# Patient Record
Sex: FEMALE | Race: WHITE | NOT HISPANIC OR LATINO | URBAN - METROPOLITAN AREA
[De-identification: names, ages, dates, MRNs, and addresses within clinical notes are randomized per-mention and may not be internally consistent; named-entity substitution may affect disease eponyms.]

---

## 2017-01-18 PROBLEM — M16.12 PRIMARY OSTEOARTHRITIS OF LEFT HIP: Status: ACTIVE | Noted: 2017-01-18

## 2017-03-01 ENCOUNTER — OUTPATIENT (OUTPATIENT)
Dept: OUTPATIENT SERVICES | Facility: HOSPITAL | Age: 67
LOS: 1 days | End: 2017-03-01
Payer: COMMERCIAL

## 2017-03-01 ENCOUNTER — OUTPATIENT (OUTPATIENT)
Dept: OUTPATIENT SERVICES | Facility: HOSPITAL | Age: 67
LOS: 1 days | End: 2017-03-01

## 2017-03-01 DIAGNOSIS — Z95.1 PRESENCE OF AORTOCORONARY BYPASS GRAFT: Chronic | ICD-10-CM

## 2017-03-01 DIAGNOSIS — Z22.321 CARRIER OR SUSPECTED CARRIER OF METHICILLIN SUSCEPTIBLE STAPHYLOCOCCUS AUREUS: ICD-10-CM

## 2017-03-01 DIAGNOSIS — I25.810 ATHEROSCLEROSIS OF CORONARY ARTERY BYPASS GRAFT(S) WITHOUT ANGINA PECTORIS: Chronic | ICD-10-CM

## 2017-03-01 DIAGNOSIS — Z96.60 PRESENCE OF UNSPECIFIED ORTHOPEDIC JOINT IMPLANT: Chronic | ICD-10-CM

## 2017-03-01 DIAGNOSIS — Z90.49 ACQUIRED ABSENCE OF OTHER SPECIFIED PARTS OF DIGESTIVE TRACT: Chronic | ICD-10-CM

## 2017-03-01 DIAGNOSIS — Z95.5 PRESENCE OF CORONARY ANGIOPLASTY IMPLANT AND GRAFT: Chronic | ICD-10-CM

## 2017-03-01 PROCEDURE — 71020: CPT | Mod: 26

## 2017-03-01 PROCEDURE — 73502 X-RAY EXAM HIP UNI 2-3 VIEWS: CPT | Mod: 26,LT

## 2017-03-01 PROCEDURE — 71046 X-RAY EXAM CHEST 2 VIEWS: CPT

## 2017-03-01 PROCEDURE — 73502 X-RAY EXAM HIP UNI 2-3 VIEWS: CPT

## 2017-03-02 LAB
MRSA PCR RESULT.: NEGATIVE — SIGNIFICANT CHANGE UP
S AUREUS DNA NOSE QL NAA+PROBE: NEGATIVE — SIGNIFICANT CHANGE UP

## 2017-03-10 ENCOUNTER — APPOINTMENT (OUTPATIENT)
Dept: ORTHOPEDIC SURGERY | Facility: CLINIC | Age: 67
End: 2017-03-10

## 2017-03-10 VITALS
WEIGHT: 175 LBS | SYSTOLIC BLOOD PRESSURE: 110 MMHG | HEIGHT: 62 IN | BODY MASS INDEX: 32.2 KG/M2 | DIASTOLIC BLOOD PRESSURE: 70 MMHG

## 2017-03-10 DIAGNOSIS — M17.11 UNILATERAL PRIMARY OSTEOARTHRITIS, RIGHT KNEE: ICD-10-CM

## 2017-03-21 ENCOUNTER — RESULT REVIEW (OUTPATIENT)
Age: 67
End: 2017-03-21

## 2017-03-21 VITALS
HEART RATE: 80 BPM | TEMPERATURE: 98 F | OXYGEN SATURATION: 97 % | WEIGHT: 202.38 LBS | RESPIRATION RATE: 16 BRPM | DIASTOLIC BLOOD PRESSURE: 71 MMHG | HEIGHT: 63 IN | SYSTOLIC BLOOD PRESSURE: 159 MMHG

## 2017-03-21 RX ORDER — CELECOXIB 200 MG/1
400 CAPSULE ORAL ONCE
Qty: 0 | Refills: 0 | Status: COMPLETED | OUTPATIENT
Start: 2017-03-22 | End: 2017-03-22

## 2017-03-21 RX ORDER — OXYCODONE HYDROCHLORIDE 5 MG/1
20 TABLET ORAL ONCE
Qty: 0 | Refills: 0 | Status: DISCONTINUED | OUTPATIENT
Start: 2017-03-22 | End: 2017-03-22

## 2017-03-21 NOTE — H&P ADULT - PMH
CAD (coronary artery disease)    Diverticulitis    H/O gastric ulcer    Hypertension    Myocardial infarction    OA (osteoarthritis)

## 2017-03-21 NOTE — H&P ADULT - PROBLEM SELECTOR PLAN 1
Admit to Orthopaedic Service.  Presents today for elective left total hip replacement  Pt medically stable and cleared for procedure today by Dr. Marie and Dr. Jennings

## 2017-03-21 NOTE — PATIENT PROFILE ADULT. - PMH
CAD (coronary artery disease)    Diverticulitis    H/O gastric ulcer    Hypertension    OA (osteoarthritis)

## 2017-03-21 NOTE — H&P ADULT - NSHPLABSRESULTS_GEN_ALL_CORE
Preop CBC, BMP, PT/PTT/INR, UA - WNL per medical clearance   Preop EKG - inferior MI indeterminate age with posterior extension - WNL per medical clearance   Preop CXR - clear lungs - WNL per medical clearance     Stress Echo - EF 50-55% - WNL per cardiac clearance Preop CBC, BMP, PT/PTT/INR, UA - WNL per medical clearance   Preop EKG - inferior MI indeterminate age with posterior extension - WNL per medical clearance   Preop CXR - clear lungs - WNL per medical clearance     Stress Echo - LVEF 50-55% - WNL per cardiac clearance

## 2017-03-21 NOTE — H&P ADULT - NSHPPHYSICALEXAM_GEN_ALL_CORE
MSK:  +decreased ROM secondary to pain, left hip    Remainder of exam per medical clearance note Gen: Pt seated in chair in NAD   Skin: Warm, color normal, no wounds, rashes, scars  Extremities: No clubbing, no cyanosis, no edema.  Musculoskeletal: Decreased ROM secondary to pain left hip. Sensation intact to light touch to bilateral LE distally. Motor Strength 5/5 to TA/GS/EHL/FHL bilaterally.  Vascular: DP/PT 1+, Brisk cap refill, Toes wwp     Remainder of exam as per medical clearance note

## 2017-03-21 NOTE — PATIENT PROFILE ADULT. - PSH
Coronary artery disease involving coronary bypass graft    History of colon resection  12/2015  History of coronary artery stent placement  x 4, 2010  S/P CABG x 3  2010  S/P total hip resurfacing  right, 2007 Coronary artery disease involving coronary bypass graft    History of colon resection  12/2015  History of coronary artery stent placement  x 4, 2010  History of total knee replacement, right    S/P CABG x 3  2010  S/P total hip resurfacing  right, 2007

## 2017-03-21 NOTE — H&P ADULT - PSH
Coronary artery disease involving coronary bypass graft    History of colon resection  12/2015  History of coronary artery stent placement  x 4, 2010  S/P CABG x 3  2010  S/P total hip resurfacing  right, 2007

## 2017-03-21 NOTE — H&P ADULT - HISTORY OF PRESENT ILLNESS
66F with left hip pain x    Presents today for elective left total hip replacement 66F with left hip pain since December that began spontaneously and without accident or trauma. Pt underwent Right Total Knee Arthroplasty in July of last year and states her left hip began to gradually bother her after that time. Pt endorses groin pain which radiates to her mid-calf and is associated with left knee pain also. Pt denies N/T of bilateral LE. Pt pain has progressively worsened without improvement and has become increasingly unresponsive to conservative management. Pt ambulates without assistance at home. Denies numbness/tingling of extremities, F/C/N/V, CP, SOB today.     Presents today for elective left total hip replacement

## 2017-03-22 ENCOUNTER — INPATIENT (INPATIENT)
Facility: HOSPITAL | Age: 67
LOS: 1 days | Discharge: HOME | DRG: 470 | End: 2017-03-24
Attending: ORTHOPAEDIC SURGERY | Admitting: ORTHOPAEDIC SURGERY
Payer: COMMERCIAL

## 2017-03-22 DIAGNOSIS — Z96.60 PRESENCE OF UNSPECIFIED ORTHOPEDIC JOINT IMPLANT: Chronic | ICD-10-CM

## 2017-03-22 DIAGNOSIS — I25.810 ATHEROSCLEROSIS OF CORONARY ARTERY BYPASS GRAFT(S) WITHOUT ANGINA PECTORIS: Chronic | ICD-10-CM

## 2017-03-22 DIAGNOSIS — Z90.49 ACQUIRED ABSENCE OF OTHER SPECIFIED PARTS OF DIGESTIVE TRACT: Chronic | ICD-10-CM

## 2017-03-22 DIAGNOSIS — M16.12 UNILATERAL PRIMARY OSTEOARTHRITIS, LEFT HIP: ICD-10-CM

## 2017-03-22 DIAGNOSIS — Z95.1 PRESENCE OF AORTOCORONARY BYPASS GRAFT: Chronic | ICD-10-CM

## 2017-03-22 DIAGNOSIS — Z95.5 PRESENCE OF CORONARY ANGIOPLASTY IMPLANT AND GRAFT: Chronic | ICD-10-CM

## 2017-03-22 DIAGNOSIS — Z96.651 PRESENCE OF RIGHT ARTIFICIAL KNEE JOINT: Chronic | ICD-10-CM

## 2017-03-22 PROCEDURE — 72170 X-RAY EXAM OF PELVIS: CPT | Mod: 26

## 2017-03-22 RX ORDER — DOCUSATE SODIUM 100 MG
100 CAPSULE ORAL THREE TIMES A DAY
Qty: 0 | Refills: 0 | Status: DISCONTINUED | OUTPATIENT
Start: 2017-03-22 | End: 2017-03-24

## 2017-03-22 RX ORDER — POLYETHYLENE GLYCOL 3350 17 G/17G
17 POWDER, FOR SOLUTION ORAL DAILY
Qty: 0 | Refills: 0 | Status: DISCONTINUED | OUTPATIENT
Start: 2017-03-22 | End: 2017-03-24

## 2017-03-22 RX ORDER — SODIUM CHLORIDE 9 MG/ML
1000 INJECTION, SOLUTION INTRAVENOUS
Qty: 0 | Refills: 0 | Status: DISCONTINUED | OUTPATIENT
Start: 2017-03-22 | End: 2017-03-24

## 2017-03-22 RX ORDER — SENNA PLUS 8.6 MG/1
2 TABLET ORAL AT BEDTIME
Qty: 0 | Refills: 0 | Status: DISCONTINUED | OUTPATIENT
Start: 2017-03-22 | End: 2017-03-24

## 2017-03-22 RX ORDER — ONDANSETRON 8 MG/1
4 TABLET, FILM COATED ORAL EVERY 6 HOURS
Qty: 0 | Refills: 0 | Status: DISCONTINUED | OUTPATIENT
Start: 2017-03-22 | End: 2017-03-24

## 2017-03-22 RX ORDER — PANTOPRAZOLE SODIUM 20 MG/1
40 TABLET, DELAYED RELEASE ORAL DAILY
Qty: 0 | Refills: 0 | Status: DISCONTINUED | OUTPATIENT
Start: 2017-03-22 | End: 2017-03-24

## 2017-03-22 RX ORDER — OXYCODONE HYDROCHLORIDE 5 MG/1
10 TABLET ORAL EVERY 4 HOURS
Qty: 0 | Refills: 0 | Status: DISCONTINUED | OUTPATIENT
Start: 2017-03-22 | End: 2017-03-24

## 2017-03-22 RX ORDER — OXYCODONE HYDROCHLORIDE 5 MG/1
5 TABLET ORAL EVERY 4 HOURS
Qty: 0 | Refills: 0 | Status: DISCONTINUED | OUTPATIENT
Start: 2017-03-22 | End: 2017-03-24

## 2017-03-22 RX ORDER — ACETAMINOPHEN 500 MG
650 TABLET ORAL EVERY 6 HOURS
Qty: 0 | Refills: 0 | Status: DISCONTINUED | OUTPATIENT
Start: 2017-03-22 | End: 2017-03-24

## 2017-03-22 RX ORDER — NITROGLYCERIN 6.5 MG
1 CAPSULE, EXTENDED RELEASE ORAL DAILY
Qty: 0 | Refills: 0 | Status: DISCONTINUED | OUTPATIENT
Start: 2017-03-22 | End: 2017-03-24

## 2017-03-22 RX ORDER — CELECOXIB 200 MG/1
200 CAPSULE ORAL
Qty: 0 | Refills: 0 | Status: DISCONTINUED | OUTPATIENT
Start: 2017-03-22 | End: 2017-03-24

## 2017-03-22 RX ORDER — CEFAZOLIN SODIUM 1 G
2000 VIAL (EA) INJECTION EVERY 8 HOURS
Qty: 0 | Refills: 0 | Status: COMPLETED | OUTPATIENT
Start: 2017-03-22 | End: 2017-03-23

## 2017-03-22 RX ORDER — MORPHINE SULFATE 50 MG/1
4 CAPSULE, EXTENDED RELEASE ORAL
Qty: 0 | Refills: 0 | Status: DISCONTINUED | OUTPATIENT
Start: 2017-03-22 | End: 2017-03-24

## 2017-03-22 RX ORDER — ASPIRIN/CALCIUM CARB/MAGNESIUM 324 MG
325 TABLET ORAL
Qty: 0 | Refills: 0 | Status: DISCONTINUED | OUTPATIENT
Start: 2017-03-22 | End: 2017-03-22

## 2017-03-22 RX ORDER — VERAPAMIL HCL 240 MG
120 CAPSULE, EXTENDED RELEASE PELLETS 24 HR ORAL
Qty: 0 | Refills: 0 | Status: DISCONTINUED | OUTPATIENT
Start: 2017-03-22 | End: 2017-03-24

## 2017-03-22 RX ORDER — LISINOPRIL 2.5 MG/1
10 TABLET ORAL DAILY
Qty: 0 | Refills: 0 | Status: DISCONTINUED | OUTPATIENT
Start: 2017-03-22 | End: 2017-03-24

## 2017-03-22 RX ORDER — ASPIRIN/CALCIUM CARB/MAGNESIUM 324 MG
81 TABLET ORAL DAILY
Qty: 0 | Refills: 0 | Status: DISCONTINUED | OUTPATIENT
Start: 2017-03-22 | End: 2017-03-24

## 2017-03-22 RX ORDER — FUROSEMIDE 40 MG
20 TABLET ORAL DAILY
Qty: 0 | Refills: 0 | Status: DISCONTINUED | OUTPATIENT
Start: 2017-03-22 | End: 2017-03-24

## 2017-03-22 RX ORDER — MORPHINE SULFATE 50 MG/1
2 CAPSULE, EXTENDED RELEASE ORAL
Qty: 0 | Refills: 0 | Status: DISCONTINUED | OUTPATIENT
Start: 2017-03-22 | End: 2017-03-24

## 2017-03-22 RX ORDER — CLOPIDOGREL BISULFATE 75 MG/1
75 TABLET, FILM COATED ORAL DAILY
Qty: 0 | Refills: 0 | Status: DISCONTINUED | OUTPATIENT
Start: 2017-03-23 | End: 2017-03-24

## 2017-03-22 RX ORDER — ASPIRIN/CALCIUM CARB/MAGNESIUM 324 MG
81 TABLET ORAL DAILY
Qty: 0 | Refills: 0 | Status: DISCONTINUED | OUTPATIENT
Start: 2017-03-22 | End: 2017-03-22

## 2017-03-22 RX ADMIN — Medication 100 MILLIGRAM(S): at 22:24

## 2017-03-22 RX ADMIN — SENNA PLUS 2 TABLET(S): 8.6 TABLET ORAL at 22:25

## 2017-03-22 RX ADMIN — OXYCODONE HYDROCHLORIDE 20 MILLIGRAM(S): 5 TABLET ORAL at 07:03

## 2017-03-22 RX ADMIN — SODIUM CHLORIDE 120 MILLILITER(S): 9 INJECTION, SOLUTION INTRAVENOUS at 13:10

## 2017-03-22 RX ADMIN — Medication 81 MILLIGRAM(S): at 22:25

## 2017-03-22 RX ADMIN — Medication 100 MILLIGRAM(S): at 16:06

## 2017-03-22 RX ADMIN — OXYCODONE HYDROCHLORIDE 5 MILLIGRAM(S): 5 TABLET ORAL at 23:24

## 2017-03-22 RX ADMIN — CELECOXIB 400 MILLIGRAM(S): 200 CAPSULE ORAL at 07:02

## 2017-03-22 RX ADMIN — PANTOPRAZOLE SODIUM 40 MILLIGRAM(S): 20 TABLET, DELAYED RELEASE ORAL at 19:55

## 2017-03-22 RX ADMIN — Medication 1 PATCH: at 19:54

## 2017-03-22 RX ADMIN — Medication 120 MILLIGRAM(S): at 19:55

## 2017-03-22 RX ADMIN — OXYCODONE HYDROCHLORIDE 5 MILLIGRAM(S): 5 TABLET ORAL at 22:24

## 2017-03-22 RX ADMIN — LISINOPRIL 10 MILLIGRAM(S): 2.5 TABLET ORAL at 22:24

## 2017-03-22 RX ADMIN — OXYCODONE HYDROCHLORIDE 5 MILLIGRAM(S): 5 TABLET ORAL at 16:26

## 2017-03-22 NOTE — PHYSICAL THERAPY INITIAL EVALUATION ADULT - LIVES WITH, PROFILE
upon discharge patient will be returning to apartment without stairs, however in two weeks will be going to an apartment with 20 steps to enter, independent prior to arrival, no hha/spouse

## 2017-03-22 NOTE — PHYSICAL THERAPY INITIAL EVALUATION ADULT - GENERAL OBSERVATIONS, REHAB EVAL
Patient received supine complaints of L hip pain 6/10 +IV +prevena +B SCD. Patient left seated in chair in NAD, pain 7/10 +lines intact +OPAL schmid

## 2017-03-22 NOTE — PROGRESS NOTE ADULT - SUBJECTIVE AND OBJECTIVE BOX
Ortho Post Op Check    Procedure: left THR  Surgeon: Pierre    Pt comfortable without complaints, pain controlled  Denies CP, SOB, N/V, numbness/tingling     Vital Signs Last 24 Hrs  T(C): 36.5, Max: 36.5 (03-22 @ 10:59)  T(F): 97.7, Max: 97.7 (03-22 @ 10:59)  HR: 52 (42 - 60)  BP: 90/40 (81/34 - 105/44)  BP(mean): --  RR: 18 (6 - 22)  SpO2: 98% (94% - 99%)  Wt(kg): --    General: Pt Alert and oriented, NAD  DSG C/D/I left hip  Pulses: pulses 2+ BL on lower extremities   Sensation: intact BL on lower extremities   Motor: EHL/FHL/TA/GS strength 5/5                          10.2   10.9  )-----------( 180      ( 22 Mar 2017 11:22 )             30.6       Post-op X-Ray: Prosthesis in place without fractures    A/P: 66yFemale POD#0 s/p left THR  - Stable  - Pain Control  - DVT ppx: aspirin, plavix  - Post op abx: Cefazolin  - PT, WBS: WBAT, Total hip protocol    Ortho Pager [phone]

## 2017-03-22 NOTE — PHYSICAL THERAPY INITIAL EVALUATION ADULT - CRITERIA FOR SKILLED THERAPEUTIC INTERVENTIONS
impairments found/rehab potential/anticipated discharge recommendation/anticipated equipment needs at discharge/functional limitations in following categories/therapy frequency

## 2017-03-23 DIAGNOSIS — I25.118 ATHEROSCLEROTIC HEART DISEASE OF NATIVE CORONARY ARTERY WITH OTHER FORMS OF ANGINA PECTORIS: ICD-10-CM

## 2017-03-23 LAB — SURGICAL PATHOLOGY STUDY: SIGNIFICANT CHANGE UP

## 2017-03-23 RX ADMIN — OXYCODONE HYDROCHLORIDE 5 MILLIGRAM(S): 5 TABLET ORAL at 11:03

## 2017-03-23 RX ADMIN — SENNA PLUS 2 TABLET(S): 8.6 TABLET ORAL at 21:45

## 2017-03-23 RX ADMIN — Medication 100 MILLIGRAM(S): at 13:08

## 2017-03-23 RX ADMIN — Medication 81 MILLIGRAM(S): at 21:45

## 2017-03-23 RX ADMIN — Medication 20 MILLIGRAM(S): at 07:20

## 2017-03-23 RX ADMIN — OXYCODONE HYDROCHLORIDE 5 MILLIGRAM(S): 5 TABLET ORAL at 23:24

## 2017-03-23 RX ADMIN — OXYCODONE HYDROCHLORIDE 5 MILLIGRAM(S): 5 TABLET ORAL at 14:08

## 2017-03-23 RX ADMIN — CLOPIDOGREL BISULFATE 75 MILLIGRAM(S): 75 TABLET, FILM COATED ORAL at 11:02

## 2017-03-23 RX ADMIN — POLYETHYLENE GLYCOL 3350 17 GRAM(S): 17 POWDER, FOR SOLUTION ORAL at 11:02

## 2017-03-23 RX ADMIN — OXYCODONE HYDROCHLORIDE 5 MILLIGRAM(S): 5 TABLET ORAL at 10:03

## 2017-03-23 RX ADMIN — PANTOPRAZOLE SODIUM 40 MILLIGRAM(S): 20 TABLET, DELAYED RELEASE ORAL at 11:02

## 2017-03-23 RX ADMIN — Medication 100 MILLIGRAM(S): at 21:45

## 2017-03-23 RX ADMIN — Medication 100 MILLIGRAM(S): at 07:20

## 2017-03-23 RX ADMIN — Medication 100 MILLIGRAM(S): at 00:23

## 2017-03-23 RX ADMIN — Medication 1 PATCH: at 07:22

## 2017-03-23 NOTE — PROGRESS NOTE ADULT - SUBJECTIVE AND OBJECTIVE BOX
appears OK  needs to re-start clopidogrel and 81mg aspirin today,  along with ranitidine  discussed with ortho staff

## 2017-03-23 NOTE — PROGRESS NOTE ADULT - SUBJECTIVE AND OBJECTIVE BOX
SUBJECTIVE: Patient seen and examined.  No issues overnight. Pain well controlled. Denies CP/SOB    OBJECTIVE:     Vital Signs Last 24 Hrs  T(C): 36.6, Max: 36.6 (03-23 @ 09:03)  T(F): 97.8, Max: 97.8 (03-23 @ 09:03)  HR: 52 (42 - 60)  BP: 122/40 (81/34 - 122/40)  BP(mean): --  RR: 18 (6 - 22)  SpO2: 97% (94% - 99%)      LLE           Dressing: clean/dry/intact            Motor exam:  TA 5/5 EHL 5/5 GSC 5/5          Sensation:   T SILT SPH SILT DP SILT         Vascular:  Warm/pink/well perfused             LABS:                        9.5    15.0  )-----------( 182      ( 23 Mar 2017 06:50 )             29.5     23 Mar 2017 06:51    142    |  108    |  27     ----------------------------<  171    4.3     |  25     |  0.85     Ca    8.2        23 Mar 2017 06:51            MEDICATIONS:  MEDICATIONS  (STANDING):  lisinopril 10milliGRAM(s) Oral daily  nitroglycerin    Patch 0.4 mG/Hr(s) 1patch Transdermal daily  verapamil SR 120milliGRAM(s) Oral two times a day  clopidogrel Tablet 75milliGRAM(s) Oral daily  furosemide    Tablet 20milliGRAM(s) Oral daily  lactated ringers. 1000milliLiter(s) IV Continuous <Continuous>  celecoxib 200milliGRAM(s) Oral with breakfast  pantoprazole    Tablet 40milliGRAM(s) Oral daily  polyethylene glycol 3350 17Gram(s) Oral daily  senna 2Tablet(s) Oral at bedtime  docusate sodium 100milliGRAM(s) Oral three times a day  aspirin enteric coated 81milliGRAM(s) Oral daily    MEDICATIONS  (PRN):  acetaminophen   Tablet 650milliGRAM(s) Oral every 6 hours PRN For Temp over 38.3 C (100.94 F)  oxyCODONE IR 5milliGRAM(s) Oral every 4 hours PRN Mild Pain  oxyCODONE IR 10milliGRAM(s) Oral every 4 hours PRN Moderate Pain  morphine  - Injectable 2milliGRAM(s) IV Push every 3 hours PRN Severe Pain  morphine  - Injectable 4milliGRAM(s) IV Push every 15 minutes PRN Severe Pain (7 - 10)  aluminum hydroxide/magnesium hydroxide/simethicone Suspension 30milliLiter(s) Oral four times a day PRN Indigestion  ondansetron Injectable 4milliGRAM(s) IV Push every 6 hours PRN Nausea and/or Vomiting      Anticoagulation:  clopidogrel Tablet 75milliGRAM(s) Oral daily  aspirin enteric coated 81milliGRAM(s) Oral daily      Antibiotics:       Pain medications:   acetaminophen   Tablet 650milliGRAM(s) Oral every 6 hours PRN  celecoxib 200milliGRAM(s) Oral with breakfast  oxyCODONE IR 5milliGRAM(s) Oral every 4 hours PRN  oxyCODONE IR 10milliGRAM(s) Oral every 4 hours PRN  morphine  - Injectable 2milliGRAM(s) IV Push every 3 hours PRN  morphine  - Injectable 4milliGRAM(s) IV Push every 15 minutes PRN  ondansetron Injectable 4milliGRAM(s) IV Push every 6 hours PRN        A/P :   s/p   L LAKESHA pod #1  -    Pain control  -    DVT ppx: clopidogrel Tablet 75milliGRAM(s) Oral daily  aspirin enteric coated 81milliGRAM(s) Oral daily    -    ADAT  -    Check AM labs  -    Weight bearing status:  WBAT  -    Resume home meds  -    Physical Therapy  -    Dispo: Home tomorrow

## 2017-03-24 ENCOUNTER — TRANSCRIPTION ENCOUNTER (OUTPATIENT)
Age: 67
End: 2017-03-24

## 2017-03-24 VITALS
OXYGEN SATURATION: 100 % | RESPIRATION RATE: 18 BRPM | HEART RATE: 75 BPM | SYSTOLIC BLOOD PRESSURE: 134 MMHG | DIASTOLIC BLOOD PRESSURE: 47 MMHG

## 2017-03-24 LAB
ANION GAP SERPL CALC-SCNC: 7 MMOL/L — LOW (ref 9–16)
BUN SERPL-MCNC: 20 MG/DL — SIGNIFICANT CHANGE UP (ref 7–23)
CALCIUM SERPL-MCNC: 7.9 MG/DL — LOW (ref 8.5–10.5)
CHLORIDE SERPL-SCNC: 104 MMOL/L — SIGNIFICANT CHANGE UP (ref 96–108)
CO2 SERPL-SCNC: 28 MMOL/L — SIGNIFICANT CHANGE UP (ref 22–31)
CREAT SERPL-MCNC: 0.79 MG/DL — SIGNIFICANT CHANGE UP (ref 0.5–1.3)
GLUCOSE SERPL-MCNC: 143 MG/DL — HIGH (ref 70–99)
HCT VFR BLD CALC: 29.2 % — LOW (ref 34.5–45)
HGB BLD-MCNC: 9.6 G/DL — LOW (ref 11.5–15.5)
MCHC RBC-ENTMCNC: 28.2 PG — SIGNIFICANT CHANGE UP (ref 27–34)
MCHC RBC-ENTMCNC: 32.9 G/DL — SIGNIFICANT CHANGE UP (ref 32–36)
MCV RBC AUTO: 85.9 FL — SIGNIFICANT CHANGE UP (ref 80–100)
PLATELET # BLD AUTO: 199 K/UL — SIGNIFICANT CHANGE UP (ref 150–400)
POTASSIUM SERPL-MCNC: 3.7 MMOL/L — SIGNIFICANT CHANGE UP (ref 3.5–5.3)
POTASSIUM SERPL-SCNC: 3.7 MMOL/L — SIGNIFICANT CHANGE UP (ref 3.5–5.3)
RBC # BLD: 3.4 M/UL — LOW (ref 3.8–5.2)
RBC # FLD: 13.8 % — SIGNIFICANT CHANGE UP (ref 10.3–16.9)
SODIUM SERPL-SCNC: 139 MMOL/L — SIGNIFICANT CHANGE UP (ref 135–145)
WBC # BLD: 11.4 K/UL — HIGH (ref 3.8–10.5)
WBC # FLD AUTO: 11.4 K/UL — HIGH (ref 3.8–10.5)

## 2017-03-24 PROCEDURE — 72170 X-RAY EXAM OF PELVIS: CPT

## 2017-03-24 PROCEDURE — C1713: CPT

## 2017-03-24 PROCEDURE — 80048 BASIC METABOLIC PNL TOTAL CA: CPT

## 2017-03-24 PROCEDURE — 36415 COLL VENOUS BLD VENIPUNCTURE: CPT

## 2017-03-24 PROCEDURE — 93010 ELECTROCARDIOGRAM REPORT: CPT

## 2017-03-24 PROCEDURE — 97161 PT EVAL LOW COMPLEX 20 MIN: CPT

## 2017-03-24 PROCEDURE — 85027 COMPLETE CBC AUTOMATED: CPT

## 2017-03-24 PROCEDURE — 76000 FLUOROSCOPY <1 HR PHYS/QHP: CPT

## 2017-03-24 PROCEDURE — 86901 BLOOD TYPING SEROLOGIC RH(D): CPT

## 2017-03-24 PROCEDURE — 93005 ELECTROCARDIOGRAM TRACING: CPT

## 2017-03-24 PROCEDURE — 97116 GAIT TRAINING THERAPY: CPT

## 2017-03-24 PROCEDURE — 86850 RBC ANTIBODY SCREEN: CPT

## 2017-03-24 PROCEDURE — C1776: CPT

## 2017-03-24 PROCEDURE — 86900 BLOOD TYPING SEROLOGIC ABO: CPT

## 2017-03-24 PROCEDURE — 88300 SURGICAL PATH GROSS: CPT

## 2017-03-24 RX ORDER — OXYCODONE AND ACETAMINOPHEN 5; 325 MG/1; MG/1
2 TABLET ORAL
Qty: 56 | Refills: 0
Start: 2017-03-24 | End: 2017-03-31

## 2017-03-24 RX ORDER — DOCUSATE SODIUM 100 MG
1 CAPSULE ORAL
Qty: 0 | Refills: 0 | DISCHARGE
Start: 2017-03-24

## 2017-03-24 RX ORDER — FAMOTIDINE 10 MG/ML
20 INJECTION INTRAVENOUS
Qty: 0 | Refills: 0 | Status: DISCONTINUED | OUTPATIENT
Start: 2017-03-24 | End: 2017-03-24

## 2017-03-24 RX ORDER — SENNA PLUS 8.6 MG/1
2 TABLET ORAL
Qty: 0 | Refills: 0 | DISCHARGE
Start: 2017-03-24

## 2017-03-24 RX ORDER — POLYETHYLENE GLYCOL 3350 17 G/17G
17 POWDER, FOR SOLUTION ORAL
Qty: 0 | Refills: 0 | DISCHARGE
Start: 2017-03-24

## 2017-03-24 RX ADMIN — CELECOXIB 200 MILLIGRAM(S): 200 CAPSULE ORAL at 10:18

## 2017-03-24 RX ADMIN — OXYCODONE HYDROCHLORIDE 5 MILLIGRAM(S): 5 TABLET ORAL at 00:24

## 2017-03-24 RX ADMIN — Medication 20 MILLIGRAM(S): at 06:16

## 2017-03-24 RX ADMIN — Medication 100 MILLIGRAM(S): at 06:16

## 2017-03-24 RX ADMIN — OXYCODONE HYDROCHLORIDE 5 MILLIGRAM(S): 5 TABLET ORAL at 09:17

## 2017-03-24 RX ADMIN — CELECOXIB 200 MILLIGRAM(S): 200 CAPSULE ORAL at 09:18

## 2017-03-24 RX ADMIN — Medication 650 MILLIGRAM(S): at 13:44

## 2017-03-24 RX ADMIN — OXYCODONE HYDROCHLORIDE 5 MILLIGRAM(S): 5 TABLET ORAL at 10:16

## 2017-03-24 RX ADMIN — CLOPIDOGREL BISULFATE 75 MILLIGRAM(S): 75 TABLET, FILM COATED ORAL at 12:09

## 2017-03-24 RX ADMIN — PANTOPRAZOLE SODIUM 40 MILLIGRAM(S): 20 TABLET, DELAYED RELEASE ORAL at 12:09

## 2017-03-24 NOTE — DISCHARGE NOTE ADULT - CARE PLAN
Principal Discharge DX:	Primary osteoarthritis of left hip  Goal:	Improved ambulation and decreased pain after left total hip replacement  Instructions for follow-up, activity and diet:	Weight bearing as tolerated on left leg with assistive device.  No hip precautions.  No strenuous activity, heavy lifting, driving, tub bathing, or returning to work until cleared by MD.  You may shower--dressing is waterproof.  Remove dressing after post op day 7, then leave incision open to air.  Follow up with Dr. Jay to schedule an appt within 10-14 days.  If you don't have a bowel movement by post op day 3, then take Milk of Magnesia (over the counter).  If no bowel movement by at least post op day 5, then use a Dulcolax suppository (over the counter) and/or a Fleets enema--if still no bowel movement, call your MD.  Contact your doctor if you experience: fever greater than 101.5, chills, chest pain, difficulty breathing, bleeding, redness or heat around the incision.

## 2017-03-24 NOTE — DISCHARGE NOTE ADULT - PATIENT PORTAL LINK FT
“You can access the Follow[employer] Patient Portal, offered by [facility], by registering with the following website: [email or web address]”

## 2017-03-24 NOTE — DISCHARGE NOTE ADULT - PLAN OF CARE
Improved ambulation and decreased pain after left total hip replacement Weight bearing as tolerated on left leg with assistive device.  No hip precautions.  No strenuous activity, heavy lifting, driving, tub bathing, or returning to work until cleared by MD.  You may shower--dressing is waterproof.  Remove dressing after post op day 7, then leave incision open to air.  Follow up with Dr. Jay to schedule an appt within 10-14 days.  If you don't have a bowel movement by post op day 3, then take Milk of Magnesia (over the counter).  If no bowel movement by at least post op day 5, then use a Dulcolax suppository (over the counter) and/or a Fleets enema--if still no bowel movement, call your MD.  Contact your doctor if you experience: fever greater than 101.5, chills, chest pain, difficulty breathing, bleeding, redness or heat around the incision.

## 2017-03-24 NOTE — DISCHARGE NOTE ADULT - NS AS ACTIVITY OBS
Do not make important decisions/Showering allowed/Do not drive or operate machinery/No Heavy lifting/straining

## 2017-03-24 NOTE — DISCHARGE NOTE ADULT - CARE PROVIDER_API CALL
Conrad Jay (MD), Orthopaedic Surgery  [address] [address]  Phone: [phone]  Fax: [phone]

## 2017-03-24 NOTE — PROGRESS NOTE ADULT - SUBJECTIVE AND OBJECTIVE BOX
SUBJECTIVE: Patient seen and examined.  No issues overnight. Pain well controlled. Denies CP/SOB    OBJECTIVE:     Vital Signs Last 24 Hrs  T(C): 37, Max: 37.1 (03-24 @ 00:58)  T(F): 98.6, Max: 98.8 (03-24 @ 00:58)  HR: 76 (62 - 76)  BP: 117/46 (111/39 - 121/44)  BP(mean): --  RR: 18 (16 - 20)  SpO2: 98% (97% - 100%)       LLe             Dressing: clean/dry/intact            Motor exam:  TA 5/5 EHL 5/5 GSC 5/5          Sensation:   T SILT SPH SILT DP SILT         Vascular:  Warm/pink/well perfused             LABS:                        9.6    11.4  )-----------( 199      ( 24 Mar 2017 10:58 )             29.2     24 Mar 2017 10:58    139    |  104    |  20     ----------------------------<  143    3.7     |  28     |  0.79     Ca    7.9        24 Mar 2017 10:58            MEDICATIONS:  MEDICATIONS  (STANDING):  lisinopril 10milliGRAM(s) Oral daily  nitroglycerin    Patch 0.4 mG/Hr(s) 1patch Transdermal daily  verapamil SR 120milliGRAM(s) Oral two times a day  clopidogrel Tablet 75milliGRAM(s) Oral daily  furosemide    Tablet 20milliGRAM(s) Oral daily  lactated ringers. 1000milliLiter(s) IV Continuous <Continuous>  celecoxib 200milliGRAM(s) Oral with breakfast  pantoprazole    Tablet 40milliGRAM(s) Oral daily  polyethylene glycol 3350 17Gram(s) Oral daily  senna 2Tablet(s) Oral at bedtime  docusate sodium 100milliGRAM(s) Oral three times a day  aspirin enteric coated 81milliGRAM(s) Oral daily  famotidine    Tablet 20milliGRAM(s) Oral two times a day    MEDICATIONS  (PRN):  acetaminophen   Tablet 650milliGRAM(s) Oral every 6 hours PRN For Temp over 38.3 C (100.94 F)  oxyCODONE IR 5milliGRAM(s) Oral every 4 hours PRN Mild Pain  oxyCODONE IR 10milliGRAM(s) Oral every 4 hours PRN Moderate Pain  morphine  - Injectable 2milliGRAM(s) IV Push every 3 hours PRN Severe Pain  morphine  - Injectable 4milliGRAM(s) IV Push every 15 minutes PRN Severe Pain (7 - 10)  aluminum hydroxide/magnesium hydroxide/simethicone Suspension 30milliLiter(s) Oral four times a day PRN Indigestion  ondansetron Injectable 4milliGRAM(s) IV Push every 6 hours PRN Nausea and/or Vomiting  bisacodyl Suppository 10milliGRAM(s) Rectal daily PRN If no bowel movement by POD#2      Anticoagulation:  clopidogrel Tablet 75milliGRAM(s) Oral daily  aspirin enteric coated 81milliGRAM(s) Oral daily      Antibiotics:       Pain medications:   acetaminophen   Tablet 650milliGRAM(s) Oral every 6 hours PRN  celecoxib 200milliGRAM(s) Oral with breakfast  oxyCODONE IR 5milliGRAM(s) Oral every 4 hours PRN  oxyCODONE IR 10milliGRAM(s) Oral every 4 hours PRN  morphine  - Injectable 2milliGRAM(s) IV Push every 3 hours PRN  morphine  - Injectable 4milliGRAM(s) IV Push every 15 minutes PRN  ondansetron Injectable 4milliGRAM(s) IV Push every 6 hours PRN        A/P :   s/p  L LAKESHA pod #2  -    Pain control  -    DVT ppx: clopidogrel Tablet 75milliGRAM(s) Oral daily  aspirin enteric coated 81milliGRAM(s) Oral daily  -    Weight bearing status:  WBAT  -    Resume home meds  -    Physical Therapy  -    Dispo: Home today

## 2017-03-24 NOTE — PROGRESS NOTE ADULT - SUBJECTIVE AND OBJECTIVE BOX
BP remains low  No dizziness palpitations or chest pain Noted burning in throat Afeb  In RR with no ectopy Chest is clear Abd is soft NO edema

## 2017-03-24 NOTE — DISCHARGE NOTE ADULT - MEDICATION SUMMARY - MEDICATIONS TO TAKE
I will START or STAY ON the medications listed below when I get home from the hospital:    aspirin 81 mg oral tablet  -- 1 tab(s) by mouth once a day  -- Indication: For Prevent blood clots/CAD    acetaminophen-oxyCODONE 325 mg-5 mg oral tablet  -- 1 to 2 tab(s) by mouth every 4 to 6 hours, as needed, pain MDD:8  -- Caution federal law prohibits the transfer of this drug to any person other  than the person for whom it was prescribed.  May cause drowsiness.  Alcohol may intensify this effect.  Use care when operating dangerous machinery.  This prescription cannot be refilled.  This product contains acetaminophen.  Do not use  with any other product containing acetaminophen to prevent possible liver damage.  Using more of this medication than prescribed may cause serious breathing problems.    -- Indication: For Moderate to severe pain    Zestril 10 mg oral tablet  -- 1 tab(s) by mouth once a day  -- Indication: For Home cardiovascular agent    Nitro-Dur 0.4 mg/hr transdermal film, extended release  -- 1 patch by transdermal patch once a day  -- Indication: For Home antianginal    verapamil 120 mg/12 hours oral tablet, extended release  -- 1 tab(s) by mouth 2 times a day  -- Indication: For Home antiarrhythmic     Livalo 2 mg oral tablet  -- 1 tab(s) by mouth once a day  -- Indication: For Hyperlipidemia    Plavix 75 mg oral tablet  -- 1 tab(s) by mouth once a day  -- Indication: For antiplatelet    Lasix 20 mg oral tablet  -- 1 tab(s) by mouth once a day  -- Indication: For Home diuretic    Zantac 150 oral tablet  -- 1 tab(s) by mouth 2 times a day  -- Indication: For gastrointestinal agent    bisacodyl 10 mg rectal suppository  -- 1 suppository(ies) rectally once a day, As needed, If no bowel movement by POD#2  -- Indication: For Constipation    docusate sodium 100 mg oral capsule  -- 1 cap(s) by mouth 3 times a day  -- Indication: For Constipation    polyethylene glycol 3350 oral powder for reconstitution  -- 17 gram(s) by mouth once a day  -- Indication: For Constipation    senna oral tablet  -- 2 tab(s) by mouth once a day (at bedtime)  -- Indication: For Constipation

## 2017-03-28 DIAGNOSIS — M16.12 UNILATERAL PRIMARY OSTEOARTHRITIS, LEFT HIP: ICD-10-CM

## 2017-03-28 DIAGNOSIS — I25.2 OLD MYOCARDIAL INFARCTION: ICD-10-CM

## 2017-03-28 DIAGNOSIS — Z95.1 PRESENCE OF AORTOCORONARY BYPASS GRAFT: ICD-10-CM

## 2017-03-28 DIAGNOSIS — I25.10 ATHEROSCLEROTIC HEART DISEASE OF NATIVE CORONARY ARTERY WITHOUT ANGINA PECTORIS: ICD-10-CM

## 2017-03-28 DIAGNOSIS — I10 ESSENTIAL (PRIMARY) HYPERTENSION: ICD-10-CM

## 2017-03-28 DIAGNOSIS — Z79.82 LONG TERM (CURRENT) USE OF ASPIRIN: ICD-10-CM

## 2017-03-28 DIAGNOSIS — Z95.5 PRESENCE OF CORONARY ANGIOPLASTY IMPLANT AND GRAFT: ICD-10-CM

## 2017-03-28 DIAGNOSIS — Z96.641 PRESENCE OF RIGHT ARTIFICIAL HIP JOINT: ICD-10-CM

## 2017-03-28 DIAGNOSIS — K21.9 GASTRO-ESOPHAGEAL REFLUX DISEASE WITHOUT ESOPHAGITIS: ICD-10-CM

## 2017-03-28 DIAGNOSIS — Z79.02 LONG TERM (CURRENT) USE OF ANTITHROMBOTICS/ANTIPLATELETS: ICD-10-CM

## 2019-10-04 NOTE — PATIENT PROFILE ADULT. - PRESSURE ULCER(S)
Chief Complaint   Patient presents with   • Urinary Complaint       HPI: A 89 year old female presents with ***.    Past Medical History:   Diagnosis Date   • Edema    • Essential hypertension, benign    • History of corneal transplant     OU  (Fuchs' corneal dystrophy)   • History of rosacea    • Hyperlipemia    • Malignant neoplasm of breast (female), unspecified site    • Osteoarthritis    • Osteopenia 4/19/2016   • Osteoporosis 02/22/2010   • Parkinson disease (CMS/HCC)    • Polycythemia    • Pseudophakia of both eyes    • PVD (posterior vitreous detachment), both eyes        Medications: reviewed in electronic record, updated today    Allergies: noted    Social History:   Social History     Tobacco Use   • Smoking status: Never Smoker   • Smokeless tobacco: Never Used   • Tobacco comment: 4 kids, lives at home, uses a walker all the time, has \"lifeline\"   Substance Use Topics   • Alcohol use: No     Alcohol/week: 0.0 standard drinks   • Drug use: No       Family History: reviewed and is either negative or non-pertinent    ROS: Aside from the positives and negatives mentioned in the HPI, the remaining 13 point review of symptoms were reviewed and are either negative or non-pertinent.    Exam:  Blood pressure 191/89, pulse 77, temperature 98.2 °F (36.8 °C), temperature source Temporal, resp. rate 20, weight 61.7 kg, SpO2 98 %.  Gen - Well developed, well nourished female, NAD  HEENT - normocephalic, atraumatic, normal nasal exam, oral cavity and visualized oropharynx are normal in appearance, moist mucous membranes, TMs are clear bilaterally  Neck - supple, no LAD, no thyromegaly  Heart - RRR, no murmurs  Lungs - CTA bilaterally, nml effort, no distress  Neuro - CN 2 - 12 intact, nml strength and sensation throughout, normal gait  Psych - normal mood/affect  Musculoskeletal/Skin - no gross deformity/lesions    Impression/Plan:   ***     no

## 2022-01-06 ENCOUNTER — TRANSCRIPTION ENCOUNTER (OUTPATIENT)
Age: 72
End: 2022-01-06

## 2022-03-15 ENCOUNTER — OUTPATIENT (OUTPATIENT)
Dept: OUTPATIENT SERVICES | Facility: HOSPITAL | Age: 72
LOS: 1 days | Discharge: HOME | End: 2022-03-15

## 2022-03-15 DIAGNOSIS — D64.9 ANEMIA, UNSPECIFIED: ICD-10-CM

## 2022-03-15 DIAGNOSIS — Z96.60 PRESENCE OF UNSPECIFIED ORTHOPEDIC JOINT IMPLANT: Chronic | ICD-10-CM

## 2022-03-15 DIAGNOSIS — Z95.1 PRESENCE OF AORTOCORONARY BYPASS GRAFT: Chronic | ICD-10-CM

## 2022-03-15 DIAGNOSIS — Z90.49 ACQUIRED ABSENCE OF OTHER SPECIFIED PARTS OF DIGESTIVE TRACT: Chronic | ICD-10-CM

## 2022-03-15 DIAGNOSIS — I25.810 ATHEROSCLEROSIS OF CORONARY ARTERY BYPASS GRAFT(S) WITHOUT ANGINA PECTORIS: Chronic | ICD-10-CM

## 2022-03-15 DIAGNOSIS — Z95.5 PRESENCE OF CORONARY ANGIOPLASTY IMPLANT AND GRAFT: Chronic | ICD-10-CM

## 2022-03-15 DIAGNOSIS — Z96.651 PRESENCE OF RIGHT ARTIFICIAL KNEE JOINT: Chronic | ICD-10-CM

## 2022-03-16 ENCOUNTER — APPOINTMENT (OUTPATIENT)
Dept: HEMATOLOGY ONCOLOGY | Facility: CLINIC | Age: 72
End: 2022-03-16

## 2023-09-15 ASSESSMENT — HOOS JR
RISING FROM SITTING: MODERATE
BENDING TO THE FLOOR TO PICK UP OBJECT: MILD
IMPORTED FORM: YES
HOOS JR RAW SCORE: 13
WALKING ON UNEVEN SURFACE: MODERATE
HOOS JR RAW SCORE: 2
SITTING: MILD
RISING FROM SITTING: MILD
IMPORTED HOOS JR SCORE: 67.52
IMPORTED HOOS JR SCORE: 85.26
LYING IN BED (TURNING OVER, MAINTAINING HIP POSITION): MODERATE
IMPORTED HOOS JR SCORE: 73.47
HOOS JR RAW SCORE: 5
HOOS JR RAW SCORE: 3
BENDING TO THE FLOOR TO PICK UP OBJECT: MODERATE
IMPORTED HOOS JR SCORE: 80.55
IMPORTED HOOS JR SCORE: 49.86
LYING IN BED (TURNING OVER, MAINTAINING HIP POSITION): MILD
RISING FROM SITTING: SEVERE
WALKING ON UNEVEN SURFACE: MILD
HOOS JR RAW SCORE: 7
GOING UP OR DOWN STAIRS: MILD
GOING UP OR DOWN STAIRS: SEVERE

## 2024-01-25 ENCOUNTER — APPOINTMENT (OUTPATIENT)
Dept: ORTHOPEDIC SURGERY | Facility: CLINIC | Age: 74
End: 2024-01-25
Payer: MEDICARE

## 2024-01-25 VITALS
DIASTOLIC BLOOD PRESSURE: 71 MMHG | HEART RATE: 76 BPM | HEIGHT: 62 IN | SYSTOLIC BLOOD PRESSURE: 127 MMHG | WEIGHT: 175 LBS | OXYGEN SATURATION: 98 % | BODY MASS INDEX: 32.2 KG/M2

## 2024-01-25 DIAGNOSIS — M17.12 UNILATERAL PRIMARY OSTEOARTHRITIS, LEFT KNEE: ICD-10-CM

## 2024-01-25 PROCEDURE — 99203 OFFICE O/P NEW LOW 30 MIN: CPT

## 2024-01-25 RX ORDER — FAMOTIDINE 10 MG/1
TABLET, FILM COATED ORAL
Refills: 0 | Status: ACTIVE | COMMUNITY

## 2024-01-25 RX ORDER — METFORMIN HYDROCHLORIDE 625 MG/1
TABLET ORAL
Refills: 0 | Status: ACTIVE | COMMUNITY

## 2024-01-26 NOTE — DISCUSSION/SUMMARY
[de-identified] : Cherelle has a new PF contusion from her recent fall. She will continue on with local measures of ice and heat alternating. She will increase her walking as tolerated. She will return on an as needed basis. She will call if any issues arise

## 2024-01-26 NOTE — END OF VISIT
[FreeTextEntry3] : All medical record entries made by MINI Patel, acting as a scribe for this encounter under the direction of Kostas Mosqueda MD . I have reviewed the chart and agree that the record accurately reflects my personal performance of the history, physical exam, assessment and plan. I have also personally directed, reviewed, and agreed with the chart.

## 2024-01-26 NOTE — HISTORY OF PRESENT ILLNESS
[de-identified] : Cherelle Kearney is a 72 yo woman who presents today for evaluation of her left knee. She has a long h.o djd and had her right knee replaced five years ago. She has been doing quite well until the other day when she tripped and fell. She landed on both knees but her left knee took the brunt of the force. She is on Plavix and has had increased anteriror knee and shin brusing and pain. She was seen by her PMD Dr Marie and was sent for xrays which were negative for fracture. her knees feel stiff and sore. She denies any locking or buckling

## 2024-01-26 NOTE — PHYSICAL EXAM
[de-identified] : The patient is a well developed, well nourished female in no apparent distress. She is alert and oriented X 3 with a pleasant mood and appropriate affect.  On physical examination of the left knee, her ROM is 0-120 degrees. The patient walks with a normal gait and stands in neutral alignment. There is no effusion. There is diffuse ecchymosis extending from the anterior surface of the patella down the medial border of the tibia. No warmth or erythema is noted. The patella is tender to palpation medially and laterally. There is patellofemoral crepitus noted. The apprehension and grind tests are negative. The extensor mechanism is intact. There is no joint line tenderness. The Henrique sign is absent. The Lachman and pivot shift tests are negative. There is no varus or valgus laxity at 0 or 30 degrees. No posterolateral or anteromedial laxity is noted. No masses are palpable. No other soft tissue or bony tenderness is noted. There is some tenderness noted on palpation of the IT band. Quadriceps weakness is noted. Neurovascular function is intact.    [de-identified] : Radiographs by report from [facility] show no evidence of fracture--there is right TKR

## 2024-05-09 ENCOUNTER — INPATIENT (INPATIENT)
Facility: HOSPITAL | Age: 74
LOS: 4 days | Discharge: HOME | End: 2024-05-14
Attending: INTERNAL MEDICINE | Admitting: INTERNAL MEDICINE
Payer: MEDICARE

## 2024-05-09 VITALS
SYSTOLIC BLOOD PRESSURE: 78 MMHG | DIASTOLIC BLOOD PRESSURE: 50 MMHG | HEART RATE: 80 BPM | TEMPERATURE: 99 F | OXYGEN SATURATION: 100 % | RESPIRATION RATE: 16 BRPM

## 2024-05-09 DIAGNOSIS — Z95.1 PRESENCE OF AORTOCORONARY BYPASS GRAFT: Chronic | ICD-10-CM

## 2024-05-09 DIAGNOSIS — Z95.5 PRESENCE OF CORONARY ANGIOPLASTY IMPLANT AND GRAFT: Chronic | ICD-10-CM

## 2024-05-09 DIAGNOSIS — Z96.60 PRESENCE OF UNSPECIFIED ORTHOPEDIC JOINT IMPLANT: Chronic | ICD-10-CM

## 2024-05-09 DIAGNOSIS — Z90.49 ACQUIRED ABSENCE OF OTHER SPECIFIED PARTS OF DIGESTIVE TRACT: Chronic | ICD-10-CM

## 2024-05-09 DIAGNOSIS — I25.810 ATHEROSCLEROSIS OF CORONARY ARTERY BYPASS GRAFT(S) WITHOUT ANGINA PECTORIS: Chronic | ICD-10-CM

## 2024-05-09 DIAGNOSIS — Z96.651 PRESENCE OF RIGHT ARTIFICIAL KNEE JOINT: Chronic | ICD-10-CM

## 2024-05-09 LAB
ADD ON TEST-SPECIMEN IN LAB: SIGNIFICANT CHANGE UP
ALBUMIN SERPL ELPH-MCNC: 3.4 G/DL — SIGNIFICANT CHANGE UP (ref 3.3–5)
ALP SERPL-CCNC: 85 U/L — SIGNIFICANT CHANGE UP (ref 40–120)
ALT FLD-CCNC: 17 U/L — SIGNIFICANT CHANGE UP (ref 10–45)
ANION GAP SERPL CALC-SCNC: 16 MMOL/L — SIGNIFICANT CHANGE UP (ref 5–17)
APTT BLD: 24.4 SEC — LOW (ref 24.5–35.6)
AST SERPL-CCNC: 48 U/L — HIGH (ref 10–40)
BASE EXCESS BLDV CALC-SCNC: -1.6 MMOL/L — SIGNIFICANT CHANGE UP (ref -2–3)
BASOPHILS # BLD AUTO: 0.03 K/UL — SIGNIFICANT CHANGE UP (ref 0–0.2)
BASOPHILS NFR BLD AUTO: 0.3 % — SIGNIFICANT CHANGE UP (ref 0–2)
BILIRUB SERPL-MCNC: 0.7 MG/DL — SIGNIFICANT CHANGE UP (ref 0.2–1.2)
BUN SERPL-MCNC: 34 MG/DL — HIGH (ref 7–23)
CALCIUM SERPL-MCNC: 8.2 MG/DL — LOW (ref 8.4–10.5)
CHLORIDE SERPL-SCNC: 94 MMOL/L — LOW (ref 96–108)
CO2 BLDV-SCNC: 22.7 MMOL/L — SIGNIFICANT CHANGE UP (ref 22–26)
CO2 SERPL-SCNC: 21 MMOL/L — LOW (ref 22–31)
CREAT SERPL-MCNC: 2.22 MG/DL — HIGH (ref 0.5–1.3)
EGFR: 23 ML/MIN/1.73M2 — LOW
EOSINOPHIL # BLD AUTO: 0 K/UL — SIGNIFICANT CHANGE UP (ref 0–0.5)
EOSINOPHIL NFR BLD AUTO: 0 % — SIGNIFICANT CHANGE UP (ref 0–6)
GAS PNL BLDV: SIGNIFICANT CHANGE UP
GLUCOSE SERPL-MCNC: 117 MG/DL — HIGH (ref 70–99)
HCO3 BLDV-SCNC: 22 MMOL/L — SIGNIFICANT CHANGE UP (ref 22–29)
HCT VFR BLD CALC: 32.6 % — LOW (ref 34.5–45)
HGB BLD-MCNC: 10.9 G/DL — LOW (ref 11.5–15.5)
IMM GRANULOCYTES NFR BLD AUTO: 0.8 % — SIGNIFICANT CHANGE UP (ref 0–0.9)
INR BLD: 1.15 — SIGNIFICANT CHANGE UP (ref 0.85–1.18)
LACTATE SERPL-SCNC: 1.4 MMOL/L — SIGNIFICANT CHANGE UP (ref 0.5–2)
LYMPHOCYTES # BLD AUTO: 0.71 K/UL — LOW (ref 1–3.3)
LYMPHOCYTES # BLD AUTO: 6.3 % — LOW (ref 13–44)
MAGNESIUM SERPL-MCNC: 1.3 MG/DL — LOW (ref 1.6–2.6)
MCHC RBC-ENTMCNC: 30 PG — SIGNIFICANT CHANGE UP (ref 27–34)
MCHC RBC-ENTMCNC: 33.4 GM/DL — SIGNIFICANT CHANGE UP (ref 32–36)
MCV RBC AUTO: 89.8 FL — SIGNIFICANT CHANGE UP (ref 80–100)
MONOCYTES # BLD AUTO: 0.54 K/UL — SIGNIFICANT CHANGE UP (ref 0–0.9)
MONOCYTES NFR BLD AUTO: 4.8 % — SIGNIFICANT CHANGE UP (ref 2–14)
NEUTROPHILS # BLD AUTO: 9.96 K/UL — HIGH (ref 1.8–7.4)
NEUTROPHILS NFR BLD AUTO: 87.8 % — HIGH (ref 43–77)
NRBC # BLD: 0 /100 WBCS — SIGNIFICANT CHANGE UP (ref 0–0)
PCO2 BLDV: 32 MMHG — LOW (ref 39–42)
PH BLDV: 7.44 — HIGH (ref 7.32–7.43)
PLATELET # BLD AUTO: 144 K/UL — LOW (ref 150–400)
PO2 BLDV: 83 MMHG — HIGH (ref 25–45)
POTASSIUM SERPL-MCNC: 2.8 MMOL/L — CRITICAL LOW (ref 3.5–5.3)
POTASSIUM SERPL-SCNC: 2.8 MMOL/L — CRITICAL LOW (ref 3.5–5.3)
PROT SERPL-MCNC: 6 G/DL — SIGNIFICANT CHANGE UP (ref 6–8.3)
PROTHROM AB SERPL-ACNC: 13.1 SEC — HIGH (ref 9.5–13)
RBC # BLD: 3.63 M/UL — LOW (ref 3.8–5.2)
RBC # FLD: 13.2 % — SIGNIFICANT CHANGE UP (ref 10.3–14.5)
SAO2 % BLDV: 97.3 % — HIGH (ref 67–88)
SODIUM SERPL-SCNC: 131 MMOL/L — LOW (ref 135–145)
TROPONIN T, HIGH SENSITIVITY RESULT: 490 NG/L — CRITICAL HIGH (ref 0–51)
WBC # BLD: 11.33 K/UL — HIGH (ref 3.8–10.5)
WBC # FLD AUTO: 11.33 K/UL — HIGH (ref 3.8–10.5)

## 2024-05-09 PROCEDURE — 70450 CT HEAD/BRAIN W/O DYE: CPT | Mod: 26,MC

## 2024-05-09 PROCEDURE — 99285 EMERGENCY DEPT VISIT HI MDM: CPT

## 2024-05-09 PROCEDURE — 93010 ELECTROCARDIOGRAM REPORT: CPT | Mod: 76

## 2024-05-09 PROCEDURE — 71045 X-RAY EXAM CHEST 1 VIEW: CPT | Mod: 26

## 2024-05-09 RX ORDER — ONDANSETRON 8 MG/1
4 TABLET, FILM COATED ORAL ONCE
Refills: 0 | Status: COMPLETED | OUTPATIENT
Start: 2024-05-09 | End: 2024-05-09

## 2024-05-09 RX ORDER — VANCOMYCIN HCL 1 G
1000 VIAL (EA) INTRAVENOUS ONCE
Refills: 0 | Status: COMPLETED | OUTPATIENT
Start: 2024-05-09 | End: 2024-05-09

## 2024-05-09 RX ORDER — ACETAMINOPHEN 500 MG
500 TABLET ORAL ONCE
Refills: 0 | Status: COMPLETED | OUTPATIENT
Start: 2024-05-09 | End: 2024-05-09

## 2024-05-09 RX ORDER — SODIUM CHLORIDE 9 MG/ML
2000 INJECTION INTRAMUSCULAR; INTRAVENOUS; SUBCUTANEOUS ONCE
Refills: 0 | Status: COMPLETED | OUTPATIENT
Start: 2024-05-09 | End: 2024-05-09

## 2024-05-09 RX ORDER — NOREPINEPHRINE BITARTRATE/D5W 8 MG/250ML
0.05 PLASTIC BAG, INJECTION (ML) INTRAVENOUS
Qty: 8 | Refills: 0 | Status: DISCONTINUED | OUTPATIENT
Start: 2024-05-09 | End: 2024-05-11

## 2024-05-09 RX ORDER — POTASSIUM CHLORIDE 20 MEQ
40 PACKET (EA) ORAL ONCE
Refills: 0 | Status: COMPLETED | OUTPATIENT
Start: 2024-05-09 | End: 2024-05-09

## 2024-05-09 RX ORDER — SODIUM CHLORIDE 9 MG/ML
1000 INJECTION, SOLUTION INTRAVENOUS ONCE
Refills: 0 | Status: COMPLETED | OUTPATIENT
Start: 2024-05-09 | End: 2024-05-09

## 2024-05-09 RX ORDER — POTASSIUM CHLORIDE 20 MEQ
10 PACKET (EA) ORAL
Refills: 0 | Status: COMPLETED | OUTPATIENT
Start: 2024-05-09 | End: 2024-05-10

## 2024-05-09 RX ORDER — POTASSIUM CHLORIDE 20 MEQ
40 PACKET (EA) ORAL ONCE
Refills: 0 | Status: COMPLETED | OUTPATIENT
Start: 2024-05-10 | End: 2024-05-10

## 2024-05-09 RX ORDER — MAGNESIUM SULFATE 500 MG/ML
4 VIAL (ML) INJECTION ONCE
Refills: 0 | Status: COMPLETED | OUTPATIENT
Start: 2024-05-09 | End: 2024-05-09

## 2024-05-09 RX ORDER — SODIUM CHLORIDE 9 MG/ML
1000 INJECTION INTRAMUSCULAR; INTRAVENOUS; SUBCUTANEOUS ONCE
Refills: 0 | Status: COMPLETED | OUTPATIENT
Start: 2024-05-09 | End: 2024-05-09

## 2024-05-09 RX ADMIN — Medication 40 MILLIEQUIVALENT(S): at 23:31

## 2024-05-09 RX ADMIN — Medication 6.37 MICROGRAM(S)/KG/MIN: at 21:42

## 2024-05-09 RX ADMIN — Medication 100 MILLIEQUIVALENT(S): at 22:15

## 2024-05-09 RX ADMIN — Medication 200 MILLIGRAM(S): at 22:03

## 2024-05-09 RX ADMIN — SODIUM CHLORIDE 1000 MILLILITER(S): 9 INJECTION, SOLUTION INTRAVENOUS at 23:40

## 2024-05-09 RX ADMIN — SODIUM CHLORIDE 2000 MILLILITER(S): 9 INJECTION INTRAMUSCULAR; INTRAVENOUS; SUBCUTANEOUS at 21:44

## 2024-05-09 RX ADMIN — SODIUM CHLORIDE 1000 MILLILITER(S): 9 INJECTION INTRAMUSCULAR; INTRAVENOUS; SUBCUTANEOUS at 21:44

## 2024-05-09 RX ADMIN — Medication 250 MILLIGRAM(S): at 22:56

## 2024-05-09 NOTE — ED PROVIDER NOTE - INPATIENT RESIDENT/ACP NOTIFIED DATE
----- Message from Stacey Glass MD sent at 3/11/2017  5:38 PM CST -----  Please ad dto my schedule for Monday morning  ----- Message -----     From: Eduin Gonzalez MD     Sent: 3/11/2017  10:37 AM       To: Stacey Glass MD, #    Can you please see this patient on Monday for further evaluation of his epididymal orchitis with hydrocele to rule out intermittent torsion?  He does have [place] Medicaid, but I would prefer earlier follow up than Friday in [place].    Thanks,  Dr. Gonzalez    
Appointment scheduled for today at 10am with   
09-May-2024 23:32

## 2024-05-09 NOTE — ED PROVIDER NOTE - NSICDXPASTSURGICALHX_GEN_ALL_CORE_FT
PAST SURGICAL HISTORY:  Coronary artery disease involving coronary bypass graft     History of colon resection 12/2015    History of coronary artery stent placement x 4, 2010    History of total knee replacement, right     S/P CABG x 3 2010    S/P total hip resurfacing right, 2007

## 2024-05-09 NOTE — ED PROVIDER NOTE - PHYSICAL EXAMINATION
General:  Ill appearing, hypotensive, no respiratory distress   HEENT:  No conjunctival injection, neck supple, no congestion, MM very dry    Chest:  Non-tender, no crepitance  Lungs:  Clear to auscultation bilaterally   Heart:  s1s2 normal, no murmur  Abdomen:  soft, non-tender, non-distended  :  Deferred  Rectal:  Deferred  Extremities: No edema, normal perfusion, no joint swelling or tenderness  Neuro:  Alert, oriented, but unclear on certain details , motor/sensory grossly intact

## 2024-05-09 NOTE — ED PROVIDER NOTE - NSICDXPASTMEDICALHX_GEN_ALL_CORE_FT
PAST MEDICAL HISTORY:  CAD (coronary artery disease)     Diverticulitis     H/O gastric ulcer     Hypertension     Myocardial infarction     OA (osteoarthritis)

## 2024-05-09 NOTE — ED PROVIDER NOTE - CLINICAL SUMMARY MEDICAL DECISION MAKING FREE TEXT BOX
73 yo F PMH noted above presents in septic shock.  Vomiting suggests GI vs. Renal source.  Absence of cough/sob/cp makes pulm source such as pneumonia unlikely. Will initiate fluids, abx, tylenol.    Summary of care:  Patient did not respond to fluid bolus and thus was started on pressors with good result and fluids continued.  ICU assessed the patient in the ER and admitted for further management.

## 2024-05-09 NOTE — ED ADULT TRIAGE NOTE - CHIEF COMPLAINT QUOTE
Pt. presents to the ED for a syncopal episode in the elevator at home. Pt. dx with a nonspecific infection, possibly viral at PCP today. Pt. hypotensive at this time. EMS .

## 2024-05-09 NOTE — ED PROVIDER NOTE - OBJECTIVE STATEMENT
75 yo F brought by EMS with report of fever, passing out.  Noted to be hypotensive  Patient started with fever and vomiting yesterday.  She reports having had a fall- thinks it was in the bathroom.   Fell bc she felt weak  Today, went to PMD and was told she might have a virus, that her pressure was low and to go home to drink.     came home 6p and noted patient felt very hot and was not at her baseline-seemed out of it, not completing sentences.  He checked her temperature and it was 103.  She was given 2 tablets of tylenol at approx 7 pm.   called Dr. Marie who advised him to take her to the ER.      PMH: CAD, HTN   On the way down to the elevator, she became very weak and passed out.  She was being supported by her  and son and so did not have any injuries  At present, still feeling weak.  No cp, sob, abd pain.  She has had a mild cough and congestion.

## 2024-05-09 NOTE — ED PROVIDER NOTE - NSICDXFAMILYHX_GEN_ALL_CORE_FT
FAMILY HISTORY:  Father  Still living? Unknown  Family history of heart disease, Age at diagnosis: Age Unknown    Mother  Still living? Unknown  Family history of colon cancer in mother, Age at diagnosis: Age Unknown  Family history of heart disease, Age at diagnosis: Age Unknown

## 2024-05-10 LAB
ACANTHOCYTES BLD QL SMEAR: SLIGHT — SIGNIFICANT CHANGE UP
ALBUMIN SERPL ELPH-MCNC: 2.9 G/DL — LOW (ref 3.3–5)
ALBUMIN SERPL ELPH-MCNC: 3.1 G/DL — LOW (ref 3.3–5)
ALP SERPL-CCNC: 80 U/L — SIGNIFICANT CHANGE UP (ref 40–120)
ALP SERPL-CCNC: 83 U/L — SIGNIFICANT CHANGE UP (ref 40–120)
ALT FLD-CCNC: 16 U/L — SIGNIFICANT CHANGE UP (ref 10–45)
ALT FLD-CCNC: 17 U/L — SIGNIFICANT CHANGE UP (ref 10–45)
ANION GAP SERPL CALC-SCNC: 13 MMOL/L — SIGNIFICANT CHANGE UP (ref 5–17)
ANION GAP SERPL CALC-SCNC: 9 MMOL/L — SIGNIFICANT CHANGE UP (ref 5–17)
ANISOCYTOSIS BLD QL: SLIGHT — SIGNIFICANT CHANGE UP
APAP SERPL-MCNC: 19 UG/ML — SIGNIFICANT CHANGE UP (ref 10–30)
APPEARANCE UR: CLEAR — SIGNIFICANT CHANGE UP
AST SERPL-CCNC: 43 U/L — HIGH (ref 10–40)
AST SERPL-CCNC: 48 U/L — HIGH (ref 10–40)
BACTERIA # UR AUTO: NEGATIVE /HPF — SIGNIFICANT CHANGE UP
BASE EXCESS BLDA CALC-SCNC: -5.7 MMOL/L — LOW (ref -2–3)
BASOPHILS # BLD AUTO: 0 K/UL — SIGNIFICANT CHANGE UP (ref 0–0.2)
BASOPHILS # BLD AUTO: 0 K/UL — SIGNIFICANT CHANGE UP (ref 0–0.2)
BASOPHILS NFR BLD AUTO: 0 % — SIGNIFICANT CHANGE UP (ref 0–2)
BASOPHILS NFR BLD AUTO: 0 % — SIGNIFICANT CHANGE UP (ref 0–2)
BILIRUB SERPL-MCNC: 0.6 MG/DL — SIGNIFICANT CHANGE UP (ref 0.2–1.2)
BILIRUB SERPL-MCNC: 0.6 MG/DL — SIGNIFICANT CHANGE UP (ref 0.2–1.2)
BILIRUB UR-MCNC: NEGATIVE — SIGNIFICANT CHANGE UP
BUN SERPL-MCNC: 24 MG/DL — HIGH (ref 7–23)
BUN SERPL-MCNC: 28 MG/DL — HIGH (ref 7–23)
BURR CELLS BLD QL SMEAR: PRESENT — SIGNIFICANT CHANGE UP
BURR CELLS BLD QL SMEAR: PRESENT — SIGNIFICANT CHANGE UP
CALCIUM SERPL-MCNC: 7.5 MG/DL — LOW (ref 8.4–10.5)
CALCIUM SERPL-MCNC: 8 MG/DL — LOW (ref 8.4–10.5)
CAST: 1 /LPF — SIGNIFICANT CHANGE UP (ref 0–4)
CHLORIDE SERPL-SCNC: 101 MMOL/L — SIGNIFICANT CHANGE UP (ref 96–108)
CHLORIDE SERPL-SCNC: 102 MMOL/L — SIGNIFICANT CHANGE UP (ref 96–108)
CK MB CFR SERPL CALC: 6.2 NG/ML — SIGNIFICANT CHANGE UP (ref 0–6.7)
CK SERPL-CCNC: 1357 U/L — HIGH (ref 25–170)
CK SERPL-CCNC: 1472 U/L — HIGH (ref 25–170)
CO2 BLDA-SCNC: 19 MMOL/L — SIGNIFICANT CHANGE UP (ref 19–24)
CO2 SERPL-SCNC: 18 MMOL/L — LOW (ref 22–31)
CO2 SERPL-SCNC: 21 MMOL/L — LOW (ref 22–31)
COLOR SPEC: YELLOW — SIGNIFICANT CHANGE UP
CREAT ?TM UR-MCNC: 20 MG/DL — SIGNIFICANT CHANGE UP
CREAT SERPL-MCNC: 1.28 MG/DL — SIGNIFICANT CHANGE UP (ref 0.5–1.3)
CREAT SERPL-MCNC: 1.62 MG/DL — HIGH (ref 0.5–1.3)
DACRYOCYTES BLD QL SMEAR: SLIGHT — SIGNIFICANT CHANGE UP
DIFF PNL FLD: ABNORMAL
DOHLE BOD BLD QL SMEAR: PRESENT — SIGNIFICANT CHANGE UP
E COLI DNA BLD POS QL NAA+NON-PROBE: SIGNIFICANT CHANGE UP
EGFR: 34 ML/MIN/1.73M2 — LOW
EGFR: 45 ML/MIN/1.73M2 — LOW
EOSINOPHIL # BLD AUTO: 0 K/UL — SIGNIFICANT CHANGE UP (ref 0–0.5)
EOSINOPHIL # BLD AUTO: 0 K/UL — SIGNIFICANT CHANGE UP (ref 0–0.5)
EOSINOPHIL NFR BLD AUTO: 0 % — SIGNIFICANT CHANGE UP (ref 0–6)
EOSINOPHIL NFR BLD AUTO: 0 % — SIGNIFICANT CHANGE UP (ref 0–6)
GAS PNL BLDA: SIGNIFICANT CHANGE UP
GAS PNL BLDV: SIGNIFICANT CHANGE UP
GLUCOSE SERPL-MCNC: 160 MG/DL — HIGH (ref 70–99)
GLUCOSE SERPL-MCNC: 198 MG/DL — HIGH (ref 70–99)
GLUCOSE UR QL: NEGATIVE MG/DL — SIGNIFICANT CHANGE UP
GRAM STN FLD: ABNORMAL
HCO3 BLDA-SCNC: 18 MMOL/L — LOW (ref 21–28)
HCT VFR BLD CALC: 32.2 % — LOW (ref 34.5–45)
HCT VFR BLD CALC: 33 % — LOW (ref 34.5–45)
HGB BLD-MCNC: 10.6 G/DL — LOW (ref 11.5–15.5)
HGB BLD-MCNC: 10.8 G/DL — LOW (ref 11.5–15.5)
KETONES UR-MCNC: NEGATIVE MG/DL — SIGNIFICANT CHANGE UP
LACTATE SERPL-SCNC: 1.4 MMOL/L — SIGNIFICANT CHANGE UP (ref 0.5–2)
LEGIONELLA AG UR QL: NEGATIVE — SIGNIFICANT CHANGE UP
LEUKOCYTE ESTERASE UR-ACNC: ABNORMAL
LYMPHOCYTES # BLD AUTO: 0.69 K/UL — LOW (ref 1–3.3)
LYMPHOCYTES # BLD AUTO: 1.11 K/UL — SIGNIFICANT CHANGE UP (ref 1–3.3)
LYMPHOCYTES # BLD AUTO: 3.5 % — LOW (ref 13–44)
LYMPHOCYTES # BLD AUTO: 8.7 % — LOW (ref 13–44)
MACROCYTES BLD QL: SLIGHT — SIGNIFICANT CHANGE UP
MAGNESIUM SERPL-MCNC: 1.2 MG/DL — LOW (ref 1.6–2.6)
MAGNESIUM SERPL-MCNC: 3.9 MG/DL — HIGH (ref 1.6–2.6)
MANUAL SMEAR VERIFICATION: SIGNIFICANT CHANGE UP
MANUAL SMEAR VERIFICATION: SIGNIFICANT CHANGE UP
MCHC RBC-ENTMCNC: 29.6 PG — SIGNIFICANT CHANGE UP (ref 27–34)
MCHC RBC-ENTMCNC: 29.7 PG — SIGNIFICANT CHANGE UP (ref 27–34)
MCHC RBC-ENTMCNC: 32.7 GM/DL — SIGNIFICANT CHANGE UP (ref 32–36)
MCHC RBC-ENTMCNC: 32.9 GM/DL — SIGNIFICANT CHANGE UP (ref 32–36)
MCV RBC AUTO: 89.9 FL — SIGNIFICANT CHANGE UP (ref 80–100)
MCV RBC AUTO: 90.7 FL — SIGNIFICANT CHANGE UP (ref 80–100)
METHOD TYPE: SIGNIFICANT CHANGE UP
MONOCYTES # BLD AUTO: 0.22 K/UL — SIGNIFICANT CHANGE UP (ref 0–0.9)
MONOCYTES # BLD AUTO: 0.69 K/UL — SIGNIFICANT CHANGE UP (ref 0–0.9)
MONOCYTES NFR BLD AUTO: 1.7 % — LOW (ref 2–14)
MONOCYTES NFR BLD AUTO: 3.5 % — SIGNIFICANT CHANGE UP (ref 2–14)
NEUTROPHILS # BLD AUTO: 11.43 K/UL — HIGH (ref 1.8–7.4)
NEUTROPHILS # BLD AUTO: 18.26 K/UL — HIGH (ref 1.8–7.4)
NEUTROPHILS NFR BLD AUTO: 86.1 % — HIGH (ref 43–77)
NEUTROPHILS NFR BLD AUTO: 88.7 % — HIGH (ref 43–77)
NEUTS BAND # BLD: 0.9 % — SIGNIFICANT CHANGE UP (ref 0–8)
NEUTS BAND # BLD: 6.9 % — SIGNIFICANT CHANGE UP (ref 0–8)
NITRITE UR-MCNC: NEGATIVE — SIGNIFICANT CHANGE UP
OSMOLALITY UR: 119 MOSM/KG — LOW (ref 300–900)
OVALOCYTES BLD QL SMEAR: SLIGHT — SIGNIFICANT CHANGE UP
OVALOCYTES BLD QL SMEAR: SLIGHT — SIGNIFICANT CHANGE UP
PCO2 BLDA: 31 MMHG — LOW (ref 32–45)
PH BLDA: 7.38 — SIGNIFICANT CHANGE UP (ref 7.35–7.45)
PH UR: 6.5 — SIGNIFICANT CHANGE UP (ref 5–8)
PHOSPHATE SERPL-MCNC: 2 MG/DL — LOW (ref 2.5–4.5)
PHOSPHATE SERPL-MCNC: 2.4 MG/DL — LOW (ref 2.5–4.5)
PHOSPHATE SERPL-MCNC: 2.5 MG/DL — SIGNIFICANT CHANGE UP (ref 2.5–4.5)
PLAT MORPH BLD: NORMAL — SIGNIFICANT CHANGE UP
PLAT MORPH BLD: NORMAL — SIGNIFICANT CHANGE UP
PLATELET # BLD AUTO: 128 K/UL — LOW (ref 150–400)
PLATELET # BLD AUTO: 161 K/UL — SIGNIFICANT CHANGE UP (ref 150–400)
PO2 BLDA: 111 MMHG — HIGH (ref 83–108)
POIKILOCYTOSIS BLD QL AUTO: SIGNIFICANT CHANGE UP
POIKILOCYTOSIS BLD QL AUTO: SLIGHT — SIGNIFICANT CHANGE UP
POLYCHROMASIA BLD QL SMEAR: SLIGHT — SIGNIFICANT CHANGE UP
POTASSIUM SERPL-MCNC: 3.3 MMOL/L — LOW (ref 3.5–5.3)
POTASSIUM SERPL-MCNC: 4.5 MMOL/L — SIGNIFICANT CHANGE UP (ref 3.5–5.3)
POTASSIUM SERPL-SCNC: 3.3 MMOL/L — LOW (ref 3.5–5.3)
POTASSIUM SERPL-SCNC: 4.5 MMOL/L — SIGNIFICANT CHANGE UP (ref 3.5–5.3)
POTASSIUM UR-SCNC: 6 MMOL/L — SIGNIFICANT CHANGE UP
PROT ?TM UR-MCNC: 12 MG/DL — SIGNIFICANT CHANGE UP (ref 0–12)
PROT SERPL-MCNC: 5.5 G/DL — LOW (ref 6–8.3)
PROT SERPL-MCNC: 5.6 G/DL — LOW (ref 6–8.3)
PROT UR-MCNC: SIGNIFICANT CHANGE UP MG/DL
PROT/CREAT UR-RTO: 0.6 RATIO — HIGH (ref 0–0.2)
RAPID RVP RESULT: SIGNIFICANT CHANGE UP
RBC # BLD: 3.58 M/UL — LOW (ref 3.8–5.2)
RBC # BLD: 3.64 M/UL — LOW (ref 3.8–5.2)
RBC # FLD: 13.2 % — SIGNIFICANT CHANGE UP (ref 10.3–14.5)
RBC # FLD: 13.4 % — SIGNIFICANT CHANGE UP (ref 10.3–14.5)
RBC BLD AUTO: ABNORMAL
RBC BLD AUTO: ABNORMAL
RBC CASTS # UR COMP ASSIST: 0 /HPF — SIGNIFICANT CHANGE UP (ref 0–4)
RENAL EPI CELLS # UR COMP ASSIST: PRESENT
S PNEUM AG UR QL: NEGATIVE — SIGNIFICANT CHANGE UP
SALICYLATES SERPL-MCNC: <0.3 MG/DL — LOW (ref 2.8–20)
SAO2 % BLDA: 98.7 % — HIGH (ref 94–98)
SARS-COV-2 RNA SPEC QL NAA+PROBE: SIGNIFICANT CHANGE UP
SODIUM SERPL-SCNC: 132 MMOL/L — LOW (ref 135–145)
SODIUM SERPL-SCNC: 132 MMOL/L — LOW (ref 135–145)
SODIUM UR-SCNC: <20 MMOL/L — SIGNIFICANT CHANGE UP
SP GR SPEC: 1 — SIGNIFICANT CHANGE UP (ref 1–1.03)
SPECIMEN SOURCE: SIGNIFICANT CHANGE UP
SPECIMEN SOURCE: SIGNIFICANT CHANGE UP
SQUAMOUS # UR AUTO: 2 /HPF — SIGNIFICANT CHANGE UP (ref 0–5)
TROPONIN T, HIGH SENSITIVITY RESULT: 301 NG/L — CRITICAL HIGH (ref 0–51)
TSH SERPL-MCNC: 1.05 UIU/ML — SIGNIFICANT CHANGE UP (ref 0.27–4.2)
UROBILINOGEN FLD QL: 0.2 MG/DL — SIGNIFICANT CHANGE UP (ref 0.2–1)
UUN UR-MCNC: 166 MG/DL — SIGNIFICANT CHANGE UP
WBC # BLD: 12.76 K/UL — HIGH (ref 3.8–10.5)
WBC # BLD: 19.63 K/UL — HIGH (ref 3.8–10.5)
WBC # FLD AUTO: 12.76 K/UL — HIGH (ref 3.8–10.5)
WBC # FLD AUTO: 19.63 K/UL — HIGH (ref 3.8–10.5)
WBC CLUMPS # UR AUTO: PRESENT
WBC UR QL: 25 /HPF — HIGH (ref 0–5)

## 2024-05-10 PROCEDURE — 99291 CRITICAL CARE FIRST HOUR: CPT

## 2024-05-10 PROCEDURE — 93010 ELECTROCARDIOGRAM REPORT: CPT

## 2024-05-10 RX ORDER — PIPERACILLIN AND TAZOBACTAM 4; .5 G/20ML; G/20ML
4.5 INJECTION, POWDER, LYOPHILIZED, FOR SOLUTION INTRAVENOUS EVERY 8 HOURS
Refills: 0 | Status: DISCONTINUED | OUTPATIENT
Start: 2024-05-10 | End: 2024-05-10

## 2024-05-10 RX ORDER — FAMOTIDINE 10 MG/ML
1 INJECTION INTRAVENOUS
Refills: 0 | DISCHARGE

## 2024-05-10 RX ORDER — ASPIRIN/CALCIUM CARB/MAGNESIUM 324 MG
81 TABLET ORAL DAILY
Refills: 0 | Status: DISCONTINUED | OUTPATIENT
Start: 2024-05-10 | End: 2024-05-14

## 2024-05-10 RX ORDER — ACETAMINOPHEN 500 MG
1000 TABLET ORAL ONCE
Refills: 0 | Status: COMPLETED | OUTPATIENT
Start: 2024-05-10 | End: 2024-05-10

## 2024-05-10 RX ORDER — PANTOPRAZOLE SODIUM 20 MG/1
40 TABLET, DELAYED RELEASE ORAL EVERY 24 HOURS
Refills: 0 | Status: DISCONTINUED | OUTPATIENT
Start: 2024-05-10 | End: 2024-05-14

## 2024-05-10 RX ORDER — PIPERACILLIN AND TAZOBACTAM 4; .5 G/20ML; G/20ML
3.38 INJECTION, POWDER, LYOPHILIZED, FOR SOLUTION INTRAVENOUS ONCE
Refills: 0 | Status: COMPLETED | OUTPATIENT
Start: 2024-05-10 | End: 2024-05-10

## 2024-05-10 RX ORDER — CLOPIDOGREL BISULFATE 75 MG/1
75 TABLET, FILM COATED ORAL DAILY
Refills: 0 | Status: DISCONTINUED | OUTPATIENT
Start: 2024-05-10 | End: 2024-05-14

## 2024-05-10 RX ORDER — PIPERACILLIN AND TAZOBACTAM 4; .5 G/20ML; G/20ML
3.38 INJECTION, POWDER, LYOPHILIZED, FOR SOLUTION INTRAVENOUS EVERY 8 HOURS
Refills: 0 | Status: DISCONTINUED | OUTPATIENT
Start: 2024-05-10 | End: 2024-05-10

## 2024-05-10 RX ORDER — SODIUM CHLORIDE 9 MG/ML
1000 INJECTION, SOLUTION INTRAVENOUS ONCE
Refills: 0 | Status: COMPLETED | OUTPATIENT
Start: 2024-05-10 | End: 2024-05-10

## 2024-05-10 RX ORDER — MEROPENEM 1 G/30ML
1000 INJECTION INTRAVENOUS EVERY 8 HOURS
Refills: 0 | Status: COMPLETED | OUTPATIENT
Start: 2024-05-10 | End: 2024-05-10

## 2024-05-10 RX ORDER — HEPARIN SODIUM 5000 [USP'U]/ML
5000 INJECTION INTRAVENOUS; SUBCUTANEOUS EVERY 8 HOURS
Refills: 0 | Status: DISCONTINUED | OUTPATIENT
Start: 2024-05-10 | End: 2024-05-14

## 2024-05-10 RX ORDER — METFORMIN HYDROCHLORIDE 850 MG/1
1 TABLET ORAL
Refills: 0 | DISCHARGE

## 2024-05-10 RX ORDER — SODIUM CHLORIDE 9 MG/ML
1000 INJECTION, SOLUTION INTRAVENOUS
Refills: 0 | Status: DISCONTINUED | OUTPATIENT
Start: 2024-05-10 | End: 2024-05-11

## 2024-05-10 RX ORDER — ATORVASTATIN CALCIUM 80 MG/1
10 TABLET, FILM COATED ORAL AT BEDTIME
Refills: 0 | Status: DISCONTINUED | OUTPATIENT
Start: 2024-05-10 | End: 2024-05-14

## 2024-05-10 RX ORDER — MAGNESIUM SULFATE 500 MG/ML
4 VIAL (ML) INJECTION ONCE
Refills: 0 | Status: COMPLETED | OUTPATIENT
Start: 2024-05-10 | End: 2024-05-10

## 2024-05-10 RX ADMIN — Medication 100 MILLIEQUIVALENT(S): at 02:01

## 2024-05-10 RX ADMIN — PIPERACILLIN AND TAZOBACTAM 25 GRAM(S): 4; .5 INJECTION, POWDER, LYOPHILIZED, FOR SOLUTION INTRAVENOUS at 06:21

## 2024-05-10 RX ADMIN — Medication 400 MILLIGRAM(S): at 19:40

## 2024-05-10 RX ADMIN — Medication 81 MILLIGRAM(S): at 11:44

## 2024-05-10 RX ADMIN — ATORVASTATIN CALCIUM 10 MILLIGRAM(S): 80 TABLET, FILM COATED ORAL at 21:13

## 2024-05-10 RX ADMIN — HEPARIN SODIUM 5000 UNIT(S): 5000 INJECTION INTRAVENOUS; SUBCUTANEOUS at 14:31

## 2024-05-10 RX ADMIN — Medication 1000 MILLIGRAM(S): at 20:33

## 2024-05-10 RX ADMIN — Medication 100 MILLIEQUIVALENT(S): at 01:35

## 2024-05-10 RX ADMIN — Medication 1000 MILLIGRAM(S): at 08:20

## 2024-05-10 RX ADMIN — PIPERACILLIN AND TAZOBACTAM 200 GRAM(S): 4; .5 INJECTION, POWDER, LYOPHILIZED, FOR SOLUTION INTRAVENOUS at 03:07

## 2024-05-10 RX ADMIN — SODIUM CHLORIDE 1000 MILLILITER(S): 9 INJECTION, SOLUTION INTRAVENOUS at 01:34

## 2024-05-10 RX ADMIN — Medication 25 GRAM(S): at 06:21

## 2024-05-10 RX ADMIN — SODIUM CHLORIDE 150 MILLILITER(S): 9 INJECTION, SOLUTION INTRAVENOUS at 03:05

## 2024-05-10 RX ADMIN — Medication 40 MILLIEQUIVALENT(S): at 03:02

## 2024-05-10 RX ADMIN — CLOPIDOGREL BISULFATE 75 MILLIGRAM(S): 75 TABLET, FILM COATED ORAL at 11:44

## 2024-05-10 RX ADMIN — MEROPENEM 100 MILLIGRAM(S): 1 INJECTION INTRAVENOUS at 21:11

## 2024-05-10 RX ADMIN — PIPERACILLIN AND TAZOBACTAM 25 GRAM(S): 4; .5 INJECTION, POWDER, LYOPHILIZED, FOR SOLUTION INTRAVENOUS at 14:30

## 2024-05-10 RX ADMIN — Medication 25 GRAM(S): at 00:20

## 2024-05-10 RX ADMIN — PANTOPRAZOLE SODIUM 40 MILLIGRAM(S): 20 TABLET, DELAYED RELEASE ORAL at 11:44

## 2024-05-10 RX ADMIN — HEPARIN SODIUM 5000 UNIT(S): 5000 INJECTION INTRAVENOUS; SUBCUTANEOUS at 21:13

## 2024-05-10 RX ADMIN — Medication 400 MILLIGRAM(S): at 08:03

## 2024-05-10 NOTE — H&P ADULT - NSHPREVIEWOFSYSTEMS_GEN_ALL_CORE
REVIEW OF SYSTEMS:    CONSTITUTIONAL: Weakness, lethargy, fevers or chills  EYES/ENT: No visual changes;  No vertigo or throat pain   NECK: No pain or stiffness  RESPIRATORY: No cough, wheezing, hemoptysis; No shortness of breath  CARDIOVASCULAR: No chest pain or palpitations  GASTROINTESTINAL: No abdominal or epigastric pain. No nausea, vomiting, or hematemesis; No diarrhea or constipation. No melena or hematochezia.  GENITOURINARY: No dysuria, frequency or hematuria  NEUROLOGICAL: No numbness or weakness  SKIN: No itching, rashes

## 2024-05-10 NOTE — H&P ADULT - NSHPLABSRESULTS_GEN_ALL_CORE
LABS:                         10.6   19.63 )-----------( 161      ( 10 May 2024 01:18 )             32.2     05-10    132<L>  |  101  |  28<H>  ----------------------------<  160<H>  3.3<L>   |  18<L>  |  1.62<H>    Ca    7.5<L>      10 May 2024 01:18  Phos  2.4     05-10  Mg     1.2     05-10    TPro  5.6<L>  /  Alb  2.9<L>  /  TBili  0.6  /  DBili  x   /  AST  48<H>  /  ALT  17  /  AlkPhos  83  05-10    PT/INR - ( 09 May 2024 21:23 )   PT: 13.1 sec;   INR: 1.15          PTT - ( 09 May 2024 21:23 )  PTT:24.4 sec  Urinalysis Basic - ( 10 May 2024 01:18 )    Color: x / Appearance: x / SG: x / pH: x  Gluc: 160 mg/dL / Ketone: x  / Bili: x / Urobili: x   Blood: x / Protein: x / Nitrite: x   Leuk Esterase: x / RBC: x / WBC x   Sq Epi: x / Non Sq Epi: x / Bacteria: x      CARDIAC MARKERS ( 10 May 2024 01:18 )  x     / x     / 1472 U/L / x     / 6.2 ng/mL        Lactate, Blood: 1.4 mmol/L (05-10 @ 01:18)  Lactate, Blood: 1.4 mmol/L (05-09 @ 21:23)      RADIOLOGY, EKG & ADDITIONAL TESTS: Reviewed.

## 2024-05-10 NOTE — ED ADULT NURSE NOTE - OBJECTIVE STATEMENT
Pt A&ox3 and able to speak in complete sentences. Pt breathing even and unlabored with equal chest rise and fall. Pt arrived d/t multiple episodes of emesis and syncopal episode at home. pt was notably hypotensive in triage and upgraded to MD Sampson. pt put in reverse Trendelenburg. pt only reports nausea.

## 2024-05-10 NOTE — DIETITIAN INITIAL EVALUATION ADULT - ADD RECOMMEND
-Continue current diet   -Encourage good PO intake  -Honor food preferences as able  -Weekly wts  -Monitor chemistry, GI function, and skin integrity

## 2024-05-10 NOTE — DIETITIAN INITIAL EVALUATION ADULT - OTHER CALCULATIONS
Needs determined using ideal body weight 50 kg (138%IBW) adjusted for elderly repletion, JUAN CARLOS.

## 2024-05-10 NOTE — PROGRESS NOTE ADULT - ASSESSMENT
74 year old female with PMHx of HTN, pre-DM, CAD s/p CABG, diverticulitis with colovesicular fistula s/p sigmoidectomy and bladder tumor resection in 2015, presenting with one week of nausea, vomiting, decreased PO intake, and fever, admitted to MICU for Hyperdynamic shock of unknown etiology, hypokalemia, and JUAN CARLOS.    NEURO  #Metabolic encephalopathy - RESOLVED  Likely 2/2 shock vs renal failure with buildup of toxins, possibly including metformin   ASA and Acetaminophen levels WNL   CTH negative  now back to baseline  -ctm    PULM  No active issues    CV  #Septic Shock    Patient meeting SIRS with Fever, Leukocytosis, end organ damage with JUAN CARLOS and AMS. s/p 4L of fluid resuscitation overnight and still needing Levo.   Other causes of shock could be hypovolemic from poor PO intake, adrenal insufficiency given electrolyte abnormalities although less likely   Possible source UTI  Plan:  - c/w Zosyn 3.375 q8, holding further Vanc given JUAN CARLOS and low suspicion for MRSA  - F/u Cxs blood and Urine   - c/w Levophed and wean as tolerated, goal MAP 65  - f/u AM cortisol level   -  LR @ 150 cc/hr     #CAD s/p CABG (over 10 years ago)  Still on ASA and plavix, unclear if both are still necessary   -c/w ASA and Plavix 75mg daily   -f/u formal TTE (no heart failure history per pt)    #HTN  -Holding home lisinopril 5mg daily, verapamil 120mg, furosemide 20mg, nitroglycerine patch 0.4    #Hypotension  Most likely septic and hypovolemic shock due to decreased PO intake  -C/w fluid resuscitation and wean levo as tolerated     GI  #Nausea, vomiting  No olonger with active nausea/vomiting  -Unclear etiology, possibly gastroenteritis, though no diarrhea. Possibly was initially gastroenteritis, then renal failure with metformin toxicity causing nausea.  -Takes famotidine 20mg BID at home for prevention, no history of gastritis or GIB      #Increased frequency and decreased volume of urine output, but no dysuria   Most likely due to JUAN CARLOS  f/u urinalysis and reflex culture    RENAL  #JUAN CARLOS  Oliguric prerenal JUAN CARLOS in setting ofnausea/vomiting and low intake. Now resolved  Cr 2.22, BUN 34   Was 0.93 in January 2024  - Monitor sCr and avoid nephrotoxins      #Hypokalemia and hypomagnesemia  QTC prolongation on EKG   Most likely due to decreased PO intake, vomiting and diuretics   Replete aggressively and recheck Q4 BMPs  Goal K>4 and Mg >2    #Elevated CK   CK noted to be 1472, could be due to dehydration. Trended down to 1357  c/w LR 150cc/hr     ID  #Sepsis with shock   Meeting SIRS with Fever (101.5), Leukocytosis (19). There is organ damage with JUAN CARLOS and AMS on admission that is now improved. Lactic acid WNL (1.4 -> 1.4). s/p 4L of fluid resuscitation prior to arrival to ICU.   Source unclear - GI given nausea/vomiting, though no abd tenderness and no diarrhea. UTI possible given increased frequency. Less likely to be Pulmonary as CXR WNL and no cough or SOB.   s/p Vancomycin in the ED     - Plan as abaove    PROPHYLACTIC MEASURE  F: LR at 150cc/hr   E: Replete as needed, K>4 and Mg >2  N: Regular diet  DVT: Heparin 5k q8  Code: FULL   Dispo: MICU

## 2024-05-10 NOTE — PROVIDER CONTACT NOTE (CRITICAL VALUE NOTIFICATION) - TEST AND RESULT REPORTED:
blood cx results gram negative rods in both specimens obtained on 5/10; aerobic and anaerobic bottles

## 2024-05-10 NOTE — CONSULT NOTE ADULT - SUBJECTIVE AND OBJECTIVE BOX
ICU CONSULT     HPI:   74 year old female with PMHx of HTN, pre-DM, CAD s/p CABG, diverticulitis with colovesicular fistula s/p sigmoidectomy and bladder tumor resection in 2015, presenting with one week of nausea, vomiting, decreased PO intake, and fever. Patient began having nausea and vomiting (liquid, non-bloody or black) about one week ago. No sick contacts. This has never happened to her before. The nausea seemed to improve then worsen again. She saw her PCP earlier today who yudi labs and suspected a viral illness. Patient felt weak after returning home and almost passed out. Family felt that she seemed altered and lethargic as well and called EMS. No diarrhea, except for one loose stool yesterday. No chest pain, no shortness of breath, no weakness/numbness/tingling. Last vomiting episode was this morning. She has continued to take her medications, including metformin, throughout this week.    Of note, she had diverticulitis in 2015 and surgery during which a colovesicular fistula and extensive intra-abdominal adhesions were found. A sigmoidectomy was performed with primary anastamosis, resection of bladder tumor, and repair of bladder.  Patient reports no problems with GI discomfort or bowel or bladder dysfunction at all since this surgery.     ED COURSE:  Vitals: T 101.5, HR 75, BP 83/43 -> 77/39 -> 97/49, RR 17, SpO2 97% on room air  Significant Labs: WBC 11.3, Hb 10.9, Plt 144, Na 131, K 2.8, CO2 21, BUN 34, Cr 2.22, Trop T 490, Lactate 1.4  EKG: Prolonged QTC   Imaging:  CTH and CXR pending  Interventions: 500mg acetaminophen, 4mg zofran, 1g vanc, levo gtt, 3 L NS     VITAL SIGNS:  ICU Vital Signs Last 24 Hrs  T(C): 36.5 (09 May 2024 23:36), Max: 38.6 (09 May 2024 21:06)  T(F): 97.7 (09 May 2024 23:36), Max: 101.5 (09 May 2024 21:06)  HR: 68 (09 May 2024 23:36) (62 - 80)  BP: 110/53 (09 May 2024 23:36) (77/39 - 112/48)  BP(mean): 74 (09 May 2024 22:26) (63 - 74)  RR: 16 (09 May 2024 23:36) (16 - 18)  SpO2: 96% (09 May 2024 23:36) (96% - 100%)    O2 Parameters below as of 09 May 2024 23:36  Patient On (Oxygen Delivery Method): room air    POCT Blood Glucose.: 114 mg/dL (09 May 2024 21:11)    PHYSICAL EXAM:  Constitutional: Fatigued, but comfortable appearing.   HEENT: NC/AT; PERRL, anicteric sclera; no oropharyngeal erythema or exudates; Dry mucous membranes  Neck: supple, no appreciable JVD  Respiratory: CTA B/L, no W/R/R; respirations appear non-labored, conversive in full sentences  Cardiovascular: +S1/S2, RRR  Gastrointestinal: abdomen soft, NT/ND  Extremities: WWP; no clubbing, cyanosis or edema  Vascular: 2+ radial, femoral, and DP/PT pulses B/L  Dermatologic: skin normal color and turgor; no visible rashes  Neurological: AOx3. Lethargic. No focal deficits.     LABS:                        10.9   11.33 )-----------( 144      ( 09 May 2024 21:26 )             32.6     05-09    131<L>  |  94<L>  |  34<H>  ----------------------------<  117<H>  2.8<LL>   |  21<L>  |  2.22<H>    Ca    8.2<L>      09 May 2024 21:26  Mg     1.3     05-09    TPro  6.0  /  Alb  3.4  /  TBili  0.7  /  DBili  x   /  AST  48<H>  /  ALT  17  /  AlkPhos  85  05-09    PT/INR - ( 09 May 2024 21:23 )   PT: 13.1 sec;   INR: 1.15          PTT - ( 09 May 2024 21:23 )  PTT:24.4 sec  Lactate, Blood: 1.4 mmol/L (05-09-24 @ 21:23)        Urinalysis Basic - ( 09 May 2024 21:26 )    Color: x / Appearance: x / SG: x / pH: x  Gluc: 117 mg/dL / Ketone: x  / Bili: x / Urobili: x   Blood: x / Protein: x / Nitrite: x   Leuk Esterase: x / RBC: x / WBC x   Sq Epi: x / Non Sq Epi: x / Bacteria: x        EKG: Reviewed.    RADIOLOGY & ADDITIONAL TESTS: Reviewed.

## 2024-05-10 NOTE — PATIENT PROFILE ADULT - FALL HARM RISK - RISK INTERVENTIONS

## 2024-05-10 NOTE — DIETITIAN INITIAL EVALUATION ADULT - PERTINENT LABORATORY DATA
05-10    132<L>  |  102  |  24<H>  ----------------------------<  198<H>  4.5   |  21<L>  |  1.28    Ca    8.0<L>      10 May 2024 09:01  Phos  2.0     05-10  Mg     3.9     05-10    TPro  5.5<L>  /  Alb  3.1<L>  /  TBili  0.6  /  DBili  x   /  AST  43<H>  /  ALT  16  /  AlkPhos  80  05-10  POCT Blood Glucose.: 114 mg/dL (05-09-24 @ 21:11)

## 2024-05-10 NOTE — PROGRESS NOTE ADULT - ATTENDING COMMENTS
74 female hx CAD s/p CABG, diverticulitis c/b colovesicular fistula s/p sigmoidectomy and bladder tumor resection in 2015 presented hypotensive and febrile in the setting of recent nausea and NBNB vomiting. Otherwise no abdominal pain or diarrhea, no recent cough, no dysuria, lactate negative.  cultures sent and started on zosyn, weaning pressors.   JUAN CARLOS improving

## 2024-05-10 NOTE — CONSULT NOTE ADULT - ATTENDING COMMENTS
74 F with HTN, CAD s/p CABG, pre-DM, diverticulitis with colovesical fistula s/p sigmoidectomy and bladder tumor resection in 2015, presenting with one week of nausea, vomiting, decreased PO intake, and fever. Patient began having nausea and vomiting (liquid, non-bloody or black) about one week ago. Physical exam as above. CT head was unremarkable.   1. Hypovolemic shock  2. Severe dehydration  3. JUAN CARLOS  4. Hypokalemia  5. Hypomagnesemia  6. Intractable nausea and vomiting  - iv fluid resuscitation  - wean norepinephrine  - unlikely metformin toxicity  - replace electrolytes

## 2024-05-10 NOTE — H&P ADULT - ASSESSMENT
74 year old female with PMHx of HTN, pre-DM, CAD s/p CABG, diverticulitis with colovesicular fistula s/p sigmoidectomy and bladder tumor resection in 2015, presenting with one week of nausea, vomiting, decreased PO intake, and fever, admitted to MICU for Hyperdynamic shock of unknown etiology, hypokalemia, and JUAN CARLOS.    NEURO  #Metabolic encephalopathy   Likely 2/2 shock vs renal failure with buildup of toxins, possibly including metformin   ASA and Acetaminophen levels WNL   CTH negative  Plan:  -f/u metformin level  -Treat septic shock and renal failure as below  - f/u TSH     PULM  No active issues    CV  #Hyperdynamic Shock   Likely due to sepsis, however there could be a component of hypovolemic shock as well   Patient meeting SIRS with Fever, Leukocytosis, end organ damage with JUAN CARLOS and AMS. s/p 4L of fluid resuscitation overnight and still needing Levo.   Other causes of shock could be hypovolemic from poor PO intake, adrenal insufficiency given electrolyte abnormalities although less likely   Plan:  - Sepsis management as below in ID   - c/w Levophed and wean as tolerated, goal MAP 65  - f/u AM cortisol level   - c/w fluids. Doing LR at 150 cc/hr to complete 5th L     #CAD s/p CABG (over 10 years ago)  Still on ASA and plavix, unclear if both are still necessary   -c/w ASA and Plavix 75mg daily   -f/u formal TTE (no heart failure history per pt)    #HTN  -Holding home lisinopril 5mg daily, verapamil 120mg, furosemide 20mg, nitroglycerine patch 0.4    #Hypotension  Most likely septic and hypovolemic shock due to decreased PO intake  -C/w fluid resuscitation and wean levo as tolerated     GI  #Nausea, vomiting  -Unclear etiology, possibly gastroenteritis, though no diarrhea. Possibly was initially gastroenteritis, then renal failure with metformin toxicity causing nausea.  -Takes famotidine 20mg BID at home for prevention, no history of gastritis or GIB      #Increased frequency and decreased volume of urine output, but no dysuria   Most likely due to JUAN CARLOS  f/u urinalysis and reflex culture    RENAL  Cr 2.22, BUN 34   Was 0.93 in January 2024  Most likely due to decreased PO intake, vomiting and also continuing metformin and Lasix  -f/u urine studies   -Strict I/Os  -Bladder scan q6 (not making much urine at this point)  -Renal US  -Consider renal consult in AM if Cr worsening and UOP does not improve     #Hypokalemia and hypomagnesemia  QTC prolongation on EKG   Most likely due to decreased PO intake, vomiting and diuretics   Replete aggressively and recheck Q4 BMPs    ID  #Sepsis with shock   Meeting SIRS with Fever (101.5), Leukocytosis (19). There is organ damage with JUAN CARLOS and AMS on admission that is now improved. Lactic acid WNL (1.4 -> 1.4). s/p 4L of fluid resuscitation prior to arrival to ICU.   Source unclear - GI given nausea/vomiting, though no abd tenderness and no diarrhea. UTI possible given increased frequency. Less likely to be Pulmonary as CXR WNL and no cough or SOB.   s/p Vancomycin in the ED   Plan:  - Will do 5th L of LR at 150cc per hour for now   - strict I&Os   - c/w Zosyn 3.375 q8, holding further Vanc given JUAN CARLOS and low suspicion for MRSA  - f/u blood and urine cultures    PROPHYLACTIC MEASURE  F: LR at 150cc/hr   E: Replete as needed  N: Regular diet  DVT: Heparin 5k q8  Code: FULL   Dispo: MICU 74 year old female with PMHx of HTN, pre-DM, CAD s/p CABG, diverticulitis with colovesicular fistula s/p sigmoidectomy and bladder tumor resection in 2015, presenting with one week of nausea, vomiting, decreased PO intake, and fever, admitted to MICU for Hyperdynamic shock of unknown etiology, hypokalemia, and JUAN CARLOS.    NEURO  #Metabolic encephalopathy   Likely 2/2 shock vs renal failure with buildup of toxins, possibly including metformin   ASA and Acetaminophen levels WNL   CTH negative  Plan:  -f/u metformin level  -Treat septic shock and renal failure as below  - f/u TSH     PULM  No active issues    CV  #Hyperdynamic Shock   Likely due to sepsis, however there could be a component of hypovolemic shock as well   Patient meeting SIRS with Fever, Leukocytosis, end organ damage with JUAN CARLOS and AMS. s/p 4L of fluid resuscitation overnight and still needing Levo.   Other causes of shock could be hypovolemic from poor PO intake, adrenal insufficiency given electrolyte abnormalities although less likely   Plan:  - Sepsis management as below in ID   - c/w Levophed and wean as tolerated, goal MAP 65  - f/u AM cortisol level   - c/w fluids. Doing LR at 150 cc/hr to complete 5th L     #CAD s/p CABG (over 10 years ago)  Still on ASA and plavix, unclear if both are still necessary   -c/w ASA and Plavix 75mg daily   -f/u formal TTE (no heart failure history per pt)    #HTN  -Holding home lisinopril 5mg daily, verapamil 120mg, furosemide 20mg, nitroglycerine patch 0.4    #Hypotension  Most likely septic and hypovolemic shock due to decreased PO intake  -C/w fluid resuscitation and wean levo as tolerated     GI  #Nausea, vomiting  -Unclear etiology, possibly gastroenteritis, though no diarrhea. Possibly was initially gastroenteritis, then renal failure with metformin toxicity causing nausea.  -Takes famotidine 20mg BID at home for prevention, no history of gastritis or GIB      #Increased frequency and decreased volume of urine output, but no dysuria   Most likely due to JUAN CARLOS  f/u urinalysis and reflex culture    RENAL  Cr 2.22, BUN 34   Was 0.93 in January 2024  Most likely due to decreased PO intake, vomiting and also continuing metformin and Lasix  -f/u urine studies   -Strict I/Os  -Bladder scan q6 (not making much urine at this point)  -Renal US  -Consider renal consult in AM if Cr worsening and UOP does not improve     #Hypokalemia and hypomagnesemia  QTC prolongation on EKG   Most likely due to decreased PO intake, vomiting and diuretics   Replete aggressively and recheck Q4 BMPs  Goal K>4 and Mg >2    ID  #Sepsis with shock   Meeting SIRS with Fever (101.5), Leukocytosis (19). There is organ damage with JUAN CARLOS and AMS on admission that is now improved. Lactic acid WNL (1.4 -> 1.4). s/p 4L of fluid resuscitation prior to arrival to ICU.   Source unclear - GI given nausea/vomiting, though no abd tenderness and no diarrhea. UTI possible given increased frequency. Less likely to be Pulmonary as CXR WNL and no cough or SOB.   s/p Vancomycin in the ED   Plan:  - Will do 5th L of LR at 150cc per hour for now   - strict I&Os   - c/w Zosyn 3.375 q8, holding further Vanc given JUAN CARLOS and low suspicion for MRSA  - f/u blood and urine cultures    PROPHYLACTIC MEASURE  F: LR at 150cc/hr   E: Replete as needed, K>4 and Mg >2  N: Regular diet  DVT: Heparin 5k q8  Code: FULL   Dispo: MICU 74 year old female with PMHx of HTN, pre-DM, CAD s/p CABG, diverticulitis with colovesicular fistula s/p sigmoidectomy and bladder tumor resection in 2015, presenting with one week of nausea, vomiting, decreased PO intake, and fever, admitted to MICU for Hyperdynamic shock of unknown etiology, hypokalemia, and JUAN CARLOS.    NEURO  #Metabolic encephalopathy   Likely 2/2 shock vs renal failure with buildup of toxins, possibly including metformin   ASA and Acetaminophen levels WNL   CTH negative  Plan:  -f/u metformin level  -Treat septic shock and renal failure as below  - f/u TSH     PULM  No active issues    CV  #Hyperdynamic Shock   Likely due to sepsis, however there could be a component of hypovolemic shock as well   Patient meeting SIRS with Fever, Leukocytosis, end organ damage with JUAN CARLOS and AMS. s/p 4L of fluid resuscitation overnight and still needing Levo.   Other causes of shock could be hypovolemic from poor PO intake, adrenal insufficiency given electrolyte abnormalities although less likely   Plan:  - Sepsis management as below in ID   - c/w Levophed and wean as tolerated, goal MAP 65  - f/u AM cortisol level   - c/w fluids. Doing LR at 150 cc/hr to complete 5th L     #CAD s/p CABG (over 10 years ago)  Still on ASA and plavix, unclear if both are still necessary   -c/w ASA and Plavix 75mg daily   -f/u formal TTE (no heart failure history per pt)    #HTN  -Holding home lisinopril 5mg daily, verapamil 120mg, furosemide 20mg, nitroglycerine patch 0.4    #Hypotension  Most likely septic and hypovolemic shock due to decreased PO intake  -C/w fluid resuscitation and wean levo as tolerated     GI  #Nausea, vomiting  -Unclear etiology, possibly gastroenteritis, though no diarrhea. Possibly was initially gastroenteritis, then renal failure with metformin toxicity causing nausea.  -Takes famotidine 20mg BID at home for prevention, no history of gastritis or GIB      #Increased frequency and decreased volume of urine output, but no dysuria   Most likely due to JUAN CARLOS  f/u urinalysis and reflex culture    RENAL  Cr 2.22, BUN 34   Was 0.93 in January 2024  Most likely due to decreased PO intake, vomiting and also continuing metformin and Lasix  -f/u urine studies   -Strict I/Os  -Bladder scan q6 (not making much urine at this point)  -Renal US  -Consider renal consult in AM if Cr worsening and UOP does not improve     #Hypokalemia and hypomagnesemia  QTC prolongation on EKG   Most likely due to decreased PO intake, vomiting and diuretics   Replete aggressively and recheck Q4 BMPs  Goal K>4 and Mg >2    #Elevated CK   CK noted to be 1472, could be due to dehydration.   c/w LR 150cc/hr     ID  #Sepsis with shock   Meeting SIRS with Fever (101.5), Leukocytosis (19). There is organ damage with JUAN CARLOS and AMS on admission that is now improved. Lactic acid WNL (1.4 -> 1.4). s/p 4L of fluid resuscitation prior to arrival to ICU.   Source unclear - GI given nausea/vomiting, though no abd tenderness and no diarrhea. UTI possible given increased frequency. Less likely to be Pulmonary as CXR WNL and no cough or SOB.   s/p Vancomycin in the ED   Plan:  - Will do 5th L of LR at 150cc per hour for now   - strict I&Os   - c/w Zosyn 3.375 q8, holding further Vanc given JUAN CARLOS and low suspicion for MRSA  - f/u blood and urine cultures    PROPHYLACTIC MEASURE  F: LR at 150cc/hr   E: Replete as needed, K>4 and Mg >2  N: Regular diet  DVT: Heparin 5k q8  Code: FULL   Dispo: MICU

## 2024-05-10 NOTE — DIETITIAN INITIAL EVALUATION ADULT - PERTINENT MEDS FT
MEDICATIONS  (STANDING):  aspirin  chewable 81 milliGRAM(s) Oral daily  atorvastatin 10 milliGRAM(s) Oral at bedtime  clopidogrel Tablet 75 milliGRAM(s) Oral daily  heparin   Injectable 5000 Unit(s) SubCutaneous every 8 hours  lactated ringers. 1000 milliLiter(s) (150 mL/Hr) IV Continuous <Continuous>  norepinephrine Infusion 0.05 MICROgram(s)/kG/Min (6.37 mL/Hr) IV Continuous <Continuous>  pantoprazole  Injectable 40 milliGRAM(s) IV Push every 24 hours  piperacillin/tazobactam IVPB.. 3.375 Gram(s) IV Intermittent every 8 hours    MEDICATIONS  (PRN):  trimethobenzamide Injectable 200 milliGRAM(s) IntraMuscular every 6 hours PRN Nausea and/or Vomiting

## 2024-05-10 NOTE — CONSULT NOTE ADULT - ASSESSMENT
74 year old female with PMHx of HTN, pre-DM, CAD s/p CABG, diverticulitis with colovesicular fistula s/p sigmoidectomy and bladder tumor resection in 2015, presenting with one week of nausea, vomiting, decreased PO intake, and fever, admitted to MICU for septic shock of unknown etiology, hypokalemia, and JUAN CARLOS.    NEURO  #Metabolic encephalopathy   Likely 2/2 septic shock vs renal failure with buildup of toxins, possibly including metformin   CTH negative  -f/u metformin, ASA, and acetaminophen levels   -Treat septic shock and renal failure as below    PULM  No active issues    CV  #CAD s/p CABG (over 10 years ago)  Still on ASA and plavix, unclear if both are still necessary   -Will restart ASA 81mg after level returns and plavix 75mg daily   -f/u TTE (no heart failure history per pt)    #HTN  -Holding home lisinopril 5mg daily, verapamil 120mg, furosemide 20mg, nitroglycerine patch 0.4    #Hypotension  Most likely septic and hypovolemic shock due to decreased PO intake  -C/w fluid resuscitation and wean levo as tolerated     GI  #Nausea, vomiting  -Unclear etiology, possibly gastroenteritis, though no diarrhea. Possibly was initially gastroenteritis, then renal failure with metformin toxicity causing nausea.  -Takes famotidine 20mg BID at home for prevention, no history of gastritis or GIB      #Increased frequency and decreased volume of urine output, but no dysuria   Most likely due to JUAN CARLOS  f/u urinalysis and reflex culture    RENAL  Cr 2.22, BUN 34   Was 0.93 in January 2024  Most likely due to decreased PO intake plus continuing to take metformin and lasix  -f/u urine studies   -Strict I/Os  -Bladder scan q6 (not making much urine at this point)  -Renal US  -Consider renal consult in AM     #Hypokalemia and hypomagnesemia  QTC prolongation on EKG   Most likely due to decreased PO intake and gastroenteritis   Replete aggressively and recheck Q4 BMPs    ID  #Shock 2/2 sepsis vs hypovolemia  Most likely sepsis given fever, though no leukocytosis  lactate 1.4  Source unclear - nausea/vomiting, though no abd tenderness and no diarrhea. UTI possible given increased frequency.   -f/u repeat lactate and labs and c/w fluid resuscitation (s/p 4 L and starting 5th)  -Zosyn 3.375 q8  -f/u blood and urine cultures    PROPHYLACTIC MEASURE  F: 5th L bolus  E: Replete with caution in JUAN CARLOS  N: Regular diet  DVT: Heparin 5k q8  Code: FULL   Dispo: MICU

## 2024-05-10 NOTE — PATIENT PROFILE ADULT - OVER THE PAST TWO WEEKS HAVE YOU FELT DOWN, DEPRESSED OR HOPELESS?
no
Dennys Sinclair (DO)  Cardiology  [address] [place] [address]  Phone: [phone]  Follow Up Time: 1 month    Jyoti Escamilla  NEUROLOGY  [address] [place] [address]  Phone: [phone]  Fax: [phone]  Follow Up Time: 1 month    JESSICA CAT  Orthopaedic Surgery  N  EMORY LOPEZ DO,    Phone: ()-  Fax: ()-  Follow Up Time: 1 week

## 2024-05-10 NOTE — ED ADULT NURSE NOTE - NSFALLHARMRISKINTERV_ED_ALL_ED
Assistance OOB with selected safe patient handling equipment if applicable/Assistance with ambulation/Communicate risk of Fall with Harm to all staff, patient, and family/Encourage patient to sit up slowly, dangle for a short time, stand at bedside before walking/Monitor gait and stability/Orthostatic vital signs/Provide patient with walking aids/Provide visual cue: red socks, yellow wristband, yellow gown, etc/Reinforce activity limits and safety measures with patient and family/Bed in lowest position, wheels locked, appropriate side rails in place/Call bell, personal items and telephone in reach/Instruct patient to call for assistance before getting out of bed/chair/stretcher/Non-slip footwear applied when patient is off stretcher/[place] to call system/Physically safe environment - no spills, clutter or unnecessary equipment/Purposeful Proactive Rounding/Room/bathroom lighting operational, light cord in reach

## 2024-05-10 NOTE — PROGRESS NOTE ADULT - SUBJECTIVE AND OBJECTIVE BOX
SUBJECTIVE/OVERNIGHT EVENTS: No acute overnight events. Pt seen in AM at bedside, resting comfortably in bed, and does not appear to be in any acute distress. When asked, pt denies any recent or active nausea, vomiting, headache, acute sob, chest pain, abdominal pain, genitourinary sx, extremity pain or swelling.    VITAL SIGNS:  Vital Signs Last 24 Hrs  T(C): 37.7 (10 May 2024 09:34), Max: 38.7 (10 May 2024 07:30)  T(F): 99.9 (10 May 2024 09:34), Max: 101.7 (10 May 2024 07:30)  HR: 54 (10 May 2024 13:00) (52 - 84)  BP: 104/52 (10 May 2024 13:00) (77/39 - 159/67)  BP(mean): 75 (10 May 2024 13:00) (56 - 96)  RR: 14 (10 May 2024 13:00) (10 - 34)  SpO2: 94% (10 May 2024 13:00) (92% - 100%)    Parameters below as of 10 May 2024 13:00  Patient On (Oxygen Delivery Method): room air      PHYSICAL EXAM:  General: NAD; speaking in full sentences  HEENT: NC/AT; PERRL; EOMI; MMM  Neck: supple; no JVD  Cardiac: RRR; +S1/S2  Pulm: CTA B/L; no W/R/R  GI: soft, NT/ND, +BS  Extremities: WWP; no edema, clubbing or cyanosis  Vasc: 2+ radial, DP pulses B/L  Neuro: AAOx3; no focal deficits    MEDICATIONS:  MEDICATIONS  (STANDING):  aspirin  chewable 81 milliGRAM(s) Oral daily  atorvastatin 10 milliGRAM(s) Oral at bedtime  clopidogrel Tablet 75 milliGRAM(s) Oral daily  heparin   Injectable 5000 Unit(s) SubCutaneous every 8 hours  lactated ringers. 1000 milliLiter(s) (150 mL/Hr) IV Continuous <Continuous>  norepinephrine Infusion 0.05 MICROgram(s)/kG/Min (6.37 mL/Hr) IV Continuous <Continuous>  pantoprazole  Injectable 40 milliGRAM(s) IV Push every 24 hours  piperacillin/tazobactam IVPB.. 3.375 Gram(s) IV Intermittent every 8 hours    MEDICATIONS  (PRN):  trimethobenzamide Injectable 200 milliGRAM(s) IntraMuscular every 6 hours PRN Nausea and/or Vomiting      ALLERGIES:  Allergies    No Known Allergies    Intolerances        LABS:                        10.8   12.76 )-----------( 128      ( 10 May 2024 05:30 )             33.0     05-10    132<L>  |  102  |  24<H>  ----------------------------<  198<H>  4.5   |  21<L>  |  1.28    Ca    8.0<L>      10 May 2024 09:01  Phos  2.0     05-10  Mg     3.9     05-10    TPro  5.5<L>  /  Alb  3.1<L>  /  TBili  0.6  /  DBili  x   /  AST  43<H>  /  ALT  16  /  AlkPhos  80  05-10    PT/INR - ( 09 May 2024 21:23 )   PT: 13.1 sec;   INR: 1.15          PTT - ( 09 May 2024 21:23 )  PTT:24.4 sec    RADIOLOGY & ADDITIONAL TESTS: Reviewed.

## 2024-05-10 NOTE — DIETITIAN INITIAL EVALUATION ADULT - CALCULATED FROM (ML/KG)
9684 Apixaban/Eliquis increases your risk for bleeding. Notify your doctor if you experience any of the following side effects: bleeding, coughing or vomiting blood, red or black stool, unexpected pain or swelling, itching or hives, chest pain, chest tightness, trouble breathing, changes in how much or how often you urinate, red or pink urine, numbness or tingling in your feet, or unusual muscle weakness. When Apixaban/Eliquis is taken with other medicines, they can affect how it works. Taking other medications such as aspirin, blood thinners, nonsteroidal anti-inflammatories, and medications that treat depression can increase your risk of bleeding. It is very important to tell your health care provider about all of the other medicines, including over-the-counter medications, herbs, and vitamins you are taking. DO NOT start, stop, or change the dosage of any medicine, including over-the-counter medicines, vitamins, and herbal products without your doctor’s approval. Any products containing aspirin or are nonsteroidal anti-inflammatories lessen the blood’s ability to form clots and add to the effect of Apixaban/Eliquis. Never take aspirin or medicines that contain aspirin without speaking to your doctor.

## 2024-05-10 NOTE — H&P ADULT - NSHPPHYSICALEXAM_GEN_ALL_CORE
T(C): 36.3 (05-10-24 @ 01:03), Max: 38.6 (05-09-24 @ 21:06)  HR: 62 (05-10-24 @ 02:00) (62 - 80)  BP: 91/50 (05-10-24 @ 02:00) (77/39 - 119/56)  RR: 13 (05-10-24 @ 02:00) (12 - 19)  SpO2: 96% (05-10-24 @ 02:00) (95% - 100%)      Constitutional: Fatigued, but comfortable appearing.   HEENT: NC/AT; PERRL, anicteric sclera; no oropharyngeal erythema or exudates; Dry mucous membranes  Neck: supple, no appreciable JVD  Respiratory: CTA B/L, no W/R/R; respirations appear non-labored, conversive in full sentences  Cardiovascular: +S1/S2, RRR  Gastrointestinal: abdomen soft, NT/ND  Extremities: WWP; no clubbing, cyanosis or edema  Vascular: 2+ radial, femoral, and DP/PT pulses B/L  Dermatologic: skin normal color and turgor; no visible rashes  Neurological: AOx3. Lethargic. No focal deficits.

## 2024-05-10 NOTE — H&P ADULT - HISTORY OF PRESENT ILLNESS
74 year old female with PMHx of HTN, pre-DM, CAD s/p CABG, diverticulitis with colovesicular fistula s/p sigmoidectomy and bladder tumor resection in 2015, presenting with one week of nausea, vomiting, decreased PO intake, and fever. Patient began having nausea and vomiting (liquid, non-bloody or black) about one week ago. No sick contacts. This has never happened to her before. The nausea seemed to improve then worsen again. She saw her PCP earlier today who yudi labs and suspected a viral illness. Patient felt weak after returning home and almost passed out. Family felt that she seemed altered and lethargic as well and called EMS. No diarrhea, except for one loose stool yesterday. No chest pain, no shortness of breath, no weakness/numbness/tingling. Last vomiting episode was this morning. She has continued to take her medications, including metformin, throughout this week.    Of note, she had diverticulitis in 2015 and surgery during which a colovesicular fistula and extensive intra-abdominal adhesions were found. A sigmoidectomy was performed with primary anastamosis, resection of bladder tumor, and repair of bladder.  Patient reports no problems with GI discomfort or bowel or bladder dysfunction at all since this surgery.     ED COURSE:  Vitals: T 101.5, HR 75, BP 83/43 -> 77/39 -> 97/49, MAP RR 17, SpO2 97% on room air  Significant Labs: WBC 11.3, Hb 10.9, Plt 144, Na 131, K 2.8, CO2 21, BUN 34, Cr 2.22, Trop T 490, Lactate 1.4  EKG: Prolonged QTC   Imaging:  CTH without acute pathology and CXR pending read but appears WNL  Interventions: 500mg acetaminophen, 4mg zofran, 1g vanc, levo gtt, 3 L NS

## 2024-05-10 NOTE — PROGRESS NOTE ADULT - SUBJECTIVE AND OBJECTIVE BOX
Rigors this am Awake and fully alert BP better In RR Good BS abd is soft No LE edema  Labs reviewed

## 2024-05-10 NOTE — DIETITIAN INITIAL EVALUATION ADULT - OTHER INFO
74 year old female with PMHx of HTN, pre-DM, CAD s/p CABG, diverticulitis with colovesicular fistula s/p sigmoidectomy and bladder tumor resection in 2015, presenting with one week of nausea, vomiting, decreased PO intake, and fever, admitted to MICU for Hyperdynamic shock of unknown etiology, hypokalemia, and JUAN CARLOS.    Pt care discussed in interdisciplinary care team rounds. Rx and labs reviewed. Pt presents with no overt signs of nutritional wasting. Pt received resting in bed, alert and participatory in nutritional assessment. Pt reports a poor but improving appetite; meal tray at bedside ~25-50% consumed. Notes a usual body weight of 150 pounds; current body weight of 150 pounds. Pt reports no significant changes to wt, appetite, or PO intake. Discussed current diet order and rationale; pt agreeable and verbalized understanding. Managed in MICU for JUAN CARLOS and septic shock. No complaints of nausea/vomiting/constipation/diarrhea or difficult chew/swallow. NKA to food. RDN will continue to reassess, intervene, and monitor as appropriate.     Pain: 0 per chart review   GI: Abdomen rounded, +BS x4, last bowel movement PTA   Skin: Warm/Dry/Intact, no edema assessed

## 2024-05-11 DIAGNOSIS — R94.31 ABNORMAL ELECTROCARDIOGRAM [ECG] [EKG]: ICD-10-CM

## 2024-05-11 DIAGNOSIS — Z29.9 ENCOUNTER FOR PROPHYLACTIC MEASURES, UNSPECIFIED: ICD-10-CM

## 2024-05-11 DIAGNOSIS — N17.9 ACUTE KIDNEY FAILURE, UNSPECIFIED: ICD-10-CM

## 2024-05-11 DIAGNOSIS — I10 ESSENTIAL (PRIMARY) HYPERTENSION: ICD-10-CM

## 2024-05-11 DIAGNOSIS — A41.51 SEPSIS DUE TO ESCHERICHIA COLI [E. COLI]: ICD-10-CM

## 2024-05-11 DIAGNOSIS — R11.2 NAUSEA WITH VOMITING, UNSPECIFIED: ICD-10-CM

## 2024-05-11 DIAGNOSIS — I25.10 ATHEROSCLEROTIC HEART DISEASE OF NATIVE CORONARY ARTERY WITHOUT ANGINA PECTORIS: ICD-10-CM

## 2024-05-11 DIAGNOSIS — J18.1 LOBAR PNEUMONIA, UNSPECIFIED ORGANISM: ICD-10-CM

## 2024-05-11 LAB
ALBUMIN SERPL ELPH-MCNC: 2.7 G/DL — LOW (ref 3.3–5)
ALP SERPL-CCNC: 117 U/L — SIGNIFICANT CHANGE UP (ref 40–120)
ALT FLD-CCNC: 23 U/L — SIGNIFICANT CHANGE UP (ref 10–45)
ANION GAP SERPL CALC-SCNC: 10 MMOL/L — SIGNIFICANT CHANGE UP (ref 5–17)
AST SERPL-CCNC: 43 U/L — HIGH (ref 10–40)
BASOPHILS # BLD AUTO: 0.03 K/UL — SIGNIFICANT CHANGE UP (ref 0–0.2)
BASOPHILS # BLD AUTO: 0.04 K/UL — SIGNIFICANT CHANGE UP (ref 0–0.2)
BASOPHILS NFR BLD AUTO: 0.3 % — SIGNIFICANT CHANGE UP (ref 0–2)
BASOPHILS NFR BLD AUTO: 0.3 % — SIGNIFICANT CHANGE UP (ref 0–2)
BILIRUB SERPL-MCNC: 0.5 MG/DL — SIGNIFICANT CHANGE UP (ref 0.2–1.2)
BUN SERPL-MCNC: 17 MG/DL — SIGNIFICANT CHANGE UP (ref 7–23)
CALCIUM SERPL-MCNC: 8.2 MG/DL — LOW (ref 8.4–10.5)
CHLORIDE SERPL-SCNC: 104 MMOL/L — SIGNIFICANT CHANGE UP (ref 96–108)
CO2 SERPL-SCNC: 19 MMOL/L — LOW (ref 22–31)
CREAT SERPL-MCNC: 1.17 MG/DL — SIGNIFICANT CHANGE UP (ref 0.5–1.3)
CULTURE RESULTS: SIGNIFICANT CHANGE UP
EGFR: 50 ML/MIN/1.73M2 — LOW
EOSINOPHIL # BLD AUTO: 0.04 K/UL — SIGNIFICANT CHANGE UP (ref 0–0.5)
EOSINOPHIL # BLD AUTO: 0.11 K/UL — SIGNIFICANT CHANGE UP (ref 0–0.5)
EOSINOPHIL NFR BLD AUTO: 0.3 % — SIGNIFICANT CHANGE UP (ref 0–6)
EOSINOPHIL NFR BLD AUTO: 1.2 % — SIGNIFICANT CHANGE UP (ref 0–6)
GLUCOSE SERPL-MCNC: 141 MG/DL — HIGH (ref 70–99)
GRAM STN FLD: ABNORMAL
HCT VFR BLD CALC: 28.4 % — LOW (ref 34.5–45)
HCT VFR BLD CALC: 32.9 % — LOW (ref 34.5–45)
HGB BLD-MCNC: 10.8 G/DL — LOW (ref 11.5–15.5)
HGB BLD-MCNC: 9 G/DL — LOW (ref 11.5–15.5)
IMM GRANULOCYTES NFR BLD AUTO: 0.3 % — SIGNIFICANT CHANGE UP (ref 0–0.9)
IMM GRANULOCYTES NFR BLD AUTO: 0.4 % — SIGNIFICANT CHANGE UP (ref 0–0.9)
LYMPHOCYTES # BLD AUTO: 0.87 K/UL — LOW (ref 1–3.3)
LYMPHOCYTES # BLD AUTO: 1.13 K/UL — SIGNIFICANT CHANGE UP (ref 1–3.3)
LYMPHOCYTES # BLD AUTO: 12.3 % — LOW (ref 13–44)
LYMPHOCYTES # BLD AUTO: 7.1 % — LOW (ref 13–44)
MAGNESIUM SERPL-MCNC: 2.7 MG/DL — HIGH (ref 1.6–2.6)
MCHC RBC-ENTMCNC: 29.3 PG — SIGNIFICANT CHANGE UP (ref 27–34)
MCHC RBC-ENTMCNC: 29.3 PG — SIGNIFICANT CHANGE UP (ref 27–34)
MCHC RBC-ENTMCNC: 31.7 GM/DL — LOW (ref 32–36)
MCHC RBC-ENTMCNC: 32.8 GM/DL — SIGNIFICANT CHANGE UP (ref 32–36)
MCV RBC AUTO: 89.4 FL — SIGNIFICANT CHANGE UP (ref 80–100)
MCV RBC AUTO: 92.5 FL — SIGNIFICANT CHANGE UP (ref 80–100)
MONOCYTES # BLD AUTO: 0.72 K/UL — SIGNIFICANT CHANGE UP (ref 0–0.9)
MONOCYTES # BLD AUTO: 0.97 K/UL — HIGH (ref 0–0.9)
MONOCYTES NFR BLD AUTO: 7.9 % — SIGNIFICANT CHANGE UP (ref 2–14)
MONOCYTES NFR BLD AUTO: 7.9 % — SIGNIFICANT CHANGE UP (ref 2–14)
NEUTROPHILS # BLD AUTO: 10.26 K/UL — HIGH (ref 1.8–7.4)
NEUTROPHILS # BLD AUTO: 7.15 K/UL — SIGNIFICANT CHANGE UP (ref 1.8–7.4)
NEUTROPHILS NFR BLD AUTO: 78 % — HIGH (ref 43–77)
NEUTROPHILS NFR BLD AUTO: 84 % — HIGH (ref 43–77)
NRBC # BLD: 0 /100 WBCS — SIGNIFICANT CHANGE UP (ref 0–0)
NRBC # BLD: 0 /100 WBCS — SIGNIFICANT CHANGE UP (ref 0–0)
PHOSPHATE SERPL-MCNC: 1.7 MG/DL — LOW (ref 2.5–4.5)
PLATELET # BLD AUTO: 152 K/UL — SIGNIFICANT CHANGE UP (ref 150–400)
PLATELET # BLD AUTO: 164 K/UL — SIGNIFICANT CHANGE UP (ref 150–400)
POTASSIUM SERPL-MCNC: 4.2 MMOL/L — SIGNIFICANT CHANGE UP (ref 3.5–5.3)
POTASSIUM SERPL-SCNC: 4.2 MMOL/L — SIGNIFICANT CHANGE UP (ref 3.5–5.3)
PROT SERPL-MCNC: 5.9 G/DL — LOW (ref 6–8.3)
RBC # BLD: 3.07 M/UL — LOW (ref 3.8–5.2)
RBC # BLD: 3.68 M/UL — LOW (ref 3.8–5.2)
RBC # FLD: 13.6 % — SIGNIFICANT CHANGE UP (ref 10.3–14.5)
RBC # FLD: 13.7 % — SIGNIFICANT CHANGE UP (ref 10.3–14.5)
SODIUM SERPL-SCNC: 133 MMOL/L — LOW (ref 135–145)
SPECIMEN SOURCE: SIGNIFICANT CHANGE UP
SPECIMEN SOURCE: SIGNIFICANT CHANGE UP
WBC # BLD: 12.23 K/UL — HIGH (ref 3.8–10.5)
WBC # BLD: 9.17 K/UL — SIGNIFICANT CHANGE UP (ref 3.8–10.5)
WBC # FLD AUTO: 12.23 K/UL — HIGH (ref 3.8–10.5)
WBC # FLD AUTO: 9.17 K/UL — SIGNIFICANT CHANGE UP (ref 3.8–10.5)

## 2024-05-11 PROCEDURE — 99233 SBSQ HOSP IP/OBS HIGH 50: CPT | Mod: GC

## 2024-05-11 RX ORDER — PIPERACILLIN AND TAZOBACTAM 4; .5 G/20ML; G/20ML
3.38 INJECTION, POWDER, LYOPHILIZED, FOR SOLUTION INTRAVENOUS EVERY 8 HOURS
Refills: 0 | Status: DISCONTINUED | OUTPATIENT
Start: 2024-05-11 | End: 2024-05-12

## 2024-05-11 RX ORDER — PIPERACILLIN AND TAZOBACTAM 4; .5 G/20ML; G/20ML
4.5 INJECTION, POWDER, LYOPHILIZED, FOR SOLUTION INTRAVENOUS EVERY 8 HOURS
Refills: 0 | Status: DISCONTINUED | OUTPATIENT
Start: 2024-05-11 | End: 2024-05-11

## 2024-05-11 RX ORDER — LISINOPRIL 2.5 MG/1
1 TABLET ORAL
Refills: 0 | DISCHARGE

## 2024-05-11 RX ORDER — ACETAMINOPHEN 500 MG
650 TABLET ORAL EVERY 6 HOURS
Refills: 0 | Status: DISCONTINUED | OUTPATIENT
Start: 2024-05-11 | End: 2024-05-14

## 2024-05-11 RX ADMIN — CLOPIDOGREL BISULFATE 75 MILLIGRAM(S): 75 TABLET, FILM COATED ORAL at 11:48

## 2024-05-11 RX ADMIN — PIPERACILLIN AND TAZOBACTAM 25 GRAM(S): 4; .5 INJECTION, POWDER, LYOPHILIZED, FOR SOLUTION INTRAVENOUS at 21:54

## 2024-05-11 RX ADMIN — Medication 85 MILLIMOLE(S): at 06:37

## 2024-05-11 RX ADMIN — HEPARIN SODIUM 5000 UNIT(S): 5000 INJECTION INTRAVENOUS; SUBCUTANEOUS at 05:28

## 2024-05-11 RX ADMIN — Medication 81 MILLIGRAM(S): at 11:48

## 2024-05-11 RX ADMIN — Medication 650 MILLIGRAM(S): at 07:11

## 2024-05-11 RX ADMIN — Medication 650 MILLIGRAM(S): at 19:24

## 2024-05-11 RX ADMIN — Medication 650 MILLIGRAM(S): at 06:36

## 2024-05-11 RX ADMIN — ATORVASTATIN CALCIUM 10 MILLIGRAM(S): 80 TABLET, FILM COATED ORAL at 21:53

## 2024-05-11 RX ADMIN — HEPARIN SODIUM 5000 UNIT(S): 5000 INJECTION INTRAVENOUS; SUBCUTANEOUS at 21:54

## 2024-05-11 RX ADMIN — Medication 650 MILLIGRAM(S): at 11:48

## 2024-05-11 RX ADMIN — PIPERACILLIN AND TAZOBACTAM 25 GRAM(S): 4; .5 INJECTION, POWDER, LYOPHILIZED, FOR SOLUTION INTRAVENOUS at 14:00

## 2024-05-11 RX ADMIN — PANTOPRAZOLE SODIUM 40 MILLIGRAM(S): 20 TABLET, DELAYED RELEASE ORAL at 11:48

## 2024-05-11 RX ADMIN — Medication 650 MILLIGRAM(S): at 12:48

## 2024-05-11 RX ADMIN — HEPARIN SODIUM 5000 UNIT(S): 5000 INJECTION INTRAVENOUS; SUBCUTANEOUS at 14:15

## 2024-05-11 RX ADMIN — Medication 650 MILLIGRAM(S): at 18:39

## 2024-05-11 NOTE — PROGRESS NOTE ADULT - ASSESSMENT
74 F with HTN, CAD s/p CABG, pre-DM, diverticulitis with colovesical fistula s/p sigmoidectomy and bladder tumor resection in 2015, presenting with one week of nausea, vomiting, decreased PO intake, and fever. Patient began having nausea and vomiting (liquid, non-bloody or black) about one week ago. Physical exam as above. CT head was unremarkable. Admitted for Hypovolemic shock, Severe dehydration, JUAN CARLOS and electrolyte abnormalities  Currently being treated with iv fluid resuscitation, wean norepinephrine, replace electrolytes .    NEURO  #Metabolic encephalopathy - RESOLVED  Likely 2/2 shock vs renal failure with buildup of toxins, possibly including metformin ASA and Acetaminophen levels WNL   CTH negative. now back to baseline  -ctm    PULM  No active issues    CV  #Septic Shock    Patient meeting SIRS with Fever, Leukocytosis, end organ damage with JUAN CARLOS and AMS. s/p 4L of fluid resuscitation overnight and still needing Levo. Other causes of shock could be hypovolemic from poor PO intake, adrenal insufficiency given electrolyte abnormalities although less likely. Possible source UTI  - c/w Zosyn 3.375 q8, holding further Vanc given JUAN CARLOS and low suspicion for MRSA  - F/u Cxs blood and Urine   - c/w Levophed and wean as tolerated, goal MAP 65  - f/u AM cortisol level   -  LR @ 150 cc/hr     #CAD s/p CABG (over 10 years ago)  Still on ASA and plavix, unclear if both are still necessary   -c/w ASA and Plavix 75mg daily   -f/u formal TTE (no heart failure history per pt)    #HTN  -Holding home lisinopril 5mg daily, verapamil 120mg, furosemide 20mg, nitroglycerine patch 0.4    #Hypotension  Most likely septic and hypovolemic shock due to decreased PO intake  -C/w fluid resuscitation and wean levo as tolerated     GI  #Nausea, vomiting  No olonger with active nausea/vomiting  -Unclear etiology, possibly gastroenteritis, though no diarrhea. Possibly was initially gastroenteritis, then renal failure with metformin toxicity causing nausea.  -Takes famotidine 20mg BID at home for prevention, no history of gastritis or GIB      #Increased frequency and decreased volume of urine output, but no dysuria   Most likely due to JUAN CARLOS  f/u urine  culture    #Nausea and vomiting  Monitor QTC  -cw Tigan 200q6    RENAL  #JUAN CARLOS  Oliguric prerenal JUAN CARLOS in setting ofnausea/vomiting and low intake. Now resolved  Cr 2.22, BUN 34   Was 0.93 in January 2024  - Monitor sCr and avoid nephrotoxins    #Hypokalemia and hypomagnesemia  QTC prolongation on EKG   Most likely due to decreased PO intake, vomiting and diuretics   Replete aggressively and recheck Q4 BMPs  Goal K>4 and Mg >2    #Elevated CK   CK noted to be 1472, could be due to dehydration. Trended down to 1357  c/w LR 150cc/hr     ID  #Sepsis with shock   Meeting SIRS with Fever (101.5), Leukocytosis (19). There is organ damage with JUAN CARLOS and AMS on admission that is now improved. Lactic acid WNL (1.4 -> 1.4). s/p 4L of fluid resuscitation prior to arrival to ICU.   Source unclear - GI given nausea/vomiting, though no abd tenderness and no diarrhea. UTI possible given increased frequency. Less likely to be Pulmonary as CXR WNL and no cough or SOB.   s/p Vancomycin in the ED  -cw meropenem      PROPHYLACTIC MEASURE  F: LR at 150cc/hr   E: Replete as needed, K>4 and Mg >2  N: Regular diet  DVT: Heparin 5k q8  Code: FULL   Dispo: MICU     74 F with HTN, CAD s/p CABG, pre-DM, diverticulitis with colovesical fistula s/p sigmoidectomy and bladder tumor resection in 2015, presenting with one week of nausea, vomiting, decreased PO intake, and fever. Patient began having nausea and vomiting (liquid, non-bloody or black) about one week ago. Physical exam as above. CT head was unremarkable. Admitted for Hypovolemic shock, Severe dehydration, JUAN CARLOS and electrolyte abnormalities  Currently being treated with iv fluid resuscitation, wean norepinephrine, replace electrolytes .    NEURO  #Metabolic encephalopathy - RESOLVED  Likely 2/2 shock vs renal failure with buildup of toxins, possibly including metformin ASA and Acetaminophen levels WNL   CTH negative. now back to baseline  -ctm    PULM  No active issues    CV  #Septic Shock    Patient meeting SIRS with Fever, Leukocytosis, end organ damage with JUAN CARLOS and AMS. s/p 4L of fluid resuscitation overnight and still needing Levo. Other causes of shock could be hypovolemic from poor PO intake, adrenal insufficiency given electrolyte abnormalities although less likely. Possible source UTI  BCx positive for GNR - E Coli  - c/w Zosyn 4.5 q8  - F/u Cxs blood and Urine   - weaned off levophed  - f/u AM cortisol level     #CAD s/p CABG (over 10 years ago)  Still on ASA and plavix, unclear if both are still necessary   -c/w ASA and Plavix 75mg daily   -f/u formal TTE (no heart failure history per pt)    #HTN  -Holding home lisinopril 5mg daily, verapamil 120mg, furosemide 20mg, nitroglycerine patch 0.4    #Hypotension  Most likely septic and hypovolemic shock due to decreased PO intake  -C/w fluid resuscitation and wean levo as tolerated     GI  #Nausea, vomiting  No longer with active nausea/vomiting  -Unclear etiology, possibly gastroenteritis, though no diarrhea. Possibly was initially gastroenteritis, then renal failure with metformin toxicity causing nausea.  -Takes famotidine 20mg BID at home for prevention, no history of gastritis or GIB  - c/w pantoprazole 40mg q24      #Increased frequency and decreased volume of urine output, but no dysuria   Most likely due to JUAN CARLOS  f/u urine culture    #Nausea and vomiting  Monitor QTC  -cw Tigan 200q6    RENAL  #JUAN CARLOS  Oliguric prerenal JUAN CARLOS in setting ofnausea/vomiting and low intake. Now resolved  Cr 2.22, BUN 34   Was 0.93 in January 2024  - Monitor sCr and avoid nephrotoxins    #Hypokalemia and hypomagnesemia  QTC prolongation on EKG   Most likely due to decreased PO intake, vomiting and diuretics   Replete aggressively and recheck Q4 BMPs  Goal K>4 and Mg >2    #Elevated CK   CK noted to be 1472, could be due to dehydration. Trended down to 1357  s/p LR 150cc/hr     ID  #Sepsis with shock   Meeting SIRS with Fever (101.5), Leukocytosis (19). There is organ damage with JUAN CARLOS and AMS on admission that is now improved. Lactic acid WNL (1.4 -> 1.4). s/p 4L of fluid resuscitation prior to arrival to ICU.   Source unclear - GI given nausea/vomiting, though no abd tenderness and no diarrhea. UTI possible given increased frequency. Less likely to be Pulmonary as CXR WNL and no cough or SOB.   s/p Vancomycin in the ED  -cw zosyn 4.5mg      PROPHYLACTIC MEASURE  F: s/p LR   E: Replete as needed, K>4 and Mg >2  N: Regular diet  DVT: Heparin 5k q8  Code: FULL   Dispo: MICU     74 F with HTN, CAD s/p CABG, pre-DM, diverticulitis with colovesical fistula s/p sigmoidectomy and bladder tumor resection in 2015, presenting with one week of nausea, vomiting, decreased PO intake, and fever. Patient began having nausea and vomiting (liquid, non-bloody or black) about one week ago. Physical exam as above. CT head was unremarkable. Admitted for Hypovolemic shock, Severe dehydration, JUAN CARLOS and electrolyte abnormalities  Currently being treated with iv fluid resuscitation, wean norepinephrine, replace electrolytes .    NEURO  #Metabolic encephalopathy - RESOLVED  Likely 2/2 shock vs renal failure with buildup of toxins, possibly including metformin ASA and Acetaminophen levels WNL   CTH negative. now back to baseline  -ctm    PULM  B/l consolidations on bedside POCUS  - chest PT  - incentive spirometry  - OOBTC    CV  #Septic Shock    Patient meeting SIRS with Fever, Leukocytosis, end organ damage with JUAN CARLOS and AMS. s/p 4L of fluid resuscitation overnight and still needing Levo. Other causes of shock could be hypovolemic from poor PO intake, adrenal insufficiency given electrolyte abnormalities although less likely. Possible source UTI  BCx positive for GNR - E Coli  - c/w Zosyn 4.5 q8  - F/u Cxs blood and Urine   - weaned off levophed  - f/u AM cortisol level     #CAD s/p CABG (over 10 years ago)  Still on ASA and plavix, unclear if both are still necessary   -c/w ASA and Plavix 75mg daily   -f/u formal TTE (no heart failure history per pt)    #HTN  -Holding home lisinopril 5mg daily, verapamil 120mg, furosemide 20mg, nitroglycerine patch 0.4    #Hypotension  Most likely septic and hypovolemic shock due to decreased PO intake  -C/w fluid resuscitation and wean levo as tolerated     GI  #Nausea, vomiting  No longer with active nausea/vomiting  -Unclear etiology, possibly gastroenteritis, though no diarrhea. Possibly was initially gastroenteritis, then renal failure with metformin toxicity causing nausea.  -Takes famotidine 20mg BID at home for prevention, no history of gastritis or GIB  - c/w pantoprazole 40mg q24      #Increased frequency and decreased volume of urine output, but no dysuria   Most likely due to JUAN CARLOS  f/u urine culture    #Nausea and vomiting  Monitor QTC  -cw Tigan 200q6    RENAL  #JUAN CARLOS  Oliguric prerenal JUAN CARLOS in setting ofnausea/vomiting and low intake. Now resolved  Cr 2.22, BUN 34   Was 0.93 in January 2024  - Monitor sCr and avoid nephrotoxins    #Hypokalemia and hypomagnesemia  QTC prolongation on EKG   Most likely due to decreased PO intake, vomiting and diuretics   Replete aggressively and recheck Q4 BMPs  Goal K>4 and Mg >2    #Elevated CK   CK noted to be 1472, could be due to dehydration. Trended down to 1357  s/p LR 150cc/hr     ID  #Sepsis with shock   Meeting SIRS with Fever (101.5), Leukocytosis (19). There is organ damage with JUAN CARLOS and AMS on admission that is now improved. Lactic acid WNL (1.4 -> 1.4). s/p 4L of fluid resuscitation prior to arrival to ICU.   Source unclear - GI given nausea/vomiting, though no abd tenderness and no diarrhea. UTI possible given increased frequency. Less likely to be Pulmonary as CXR WNL and no cough or SOB.   s/p Vancomycin in the ED  -cw zosyn 4.5mg      PROPHYLACTIC MEASURE  F: s/p LR   E: Replete as needed, K>4 and Mg >2  N: Regular diet  DVT: Heparin 5k q8  Code: FULL   Dispo: MICU

## 2024-05-11 NOTE — PROGRESS NOTE ADULT - PROBLEM SELECTOR PLAN 7
CAD s/p CABG   Patient on on ASA and plavix     - c/w ASA and Plavix 75mg qd  - f/u formal TTE (no heart failure history per pt)

## 2024-05-11 NOTE — PROGRESS NOTE ADULT - SUBJECTIVE AND OBJECTIVE BOX
***** STEP DOWN FROM MICU TO RMF *****    HOSPITAL COURSE:  74 year old F with PMHx of HTN, pre-DM, CAD s/p CABG, diverticulitis with colovesicular fistula s/p sigmoidectomy and bladder tumor resection in 2015, presenting with one week hx of nausea, vomiting, decreased PO intake, and fever, admitted to MICU for hypervolemic shock of unknown etiology, hypokalemia, and JUAN CARLOS. Started on minimal pressors and weaned off. Blood cultures positive for gram negative rods. Being treated with Zosyn pseudomonal dosing. Legionella negative. Bilateral consolidations on bedside POCUS. Patient off pressors and stable for stepdown to RMF.     Subjective/ROS: Patient seen and examined at bedside.     Denies Fever/Chills, HA, CP, SOB, n/v, changes in bowel/urinary habits.  12pt ROS otherwise negative.    VITALS  Vital Signs Last 24 Hrs  T(C): 38.9 (11 May 2024 11:00), Max: 39.4 (10 May 2024 19:00)  T(F): 102 (11 May 2024 11:00), Max: 103 (10 May 2024 19:00)  HR: 69 (11 May 2024 14:00) (57 - 83)  BP: 115/56 (11 May 2024 14:00) (96/48 - 161/64)  BP(mean): 81 (11 May 2024 14:00) (67 - 109)  RR: 27 (11 May 2024 14:00) (14 - 31)  SpO2: 96% (11 May 2024 14:00) (91% - 100%)    Parameters below as of 11 May 2024 14:00  Patient On (Oxygen Delivery Method): room air    CAPILLARY BLOOD GLUCOSE    PHYSICAL EXAM  General: NAD, sitting up in a chair, reports tolerating PO  Head: NC/AT; MMM   Respiratory: CTAB, however patient struggling to take deep breaths, mainly taking shallow breaths b/l   Cardiovascular: Regular rhythm/rate  Gastrointestinal: Soft; NTND  Genitourinary: no suprapubic tenderness   Extremities: WWP; no edema/cyanosis in the UE or LE b/l   Neurological: A&Ox3, CNII-XII grossly intact; no obvious focal deficits    MEDICATIONS  (STANDING):  aspirin  chewable 81 milliGRAM(s) Oral daily  atorvastatin 10 milliGRAM(s) Oral at bedtime  clopidogrel Tablet 75 milliGRAM(s) Oral daily  heparin   Injectable 5000 Unit(s) SubCutaneous every 8 hours  pantoprazole  Injectable 40 milliGRAM(s) IV Push every 24 hours  piperacillin/tazobactam IVPB.. 4.5 Gram(s) IV Intermittent every 8 hours    MEDICATIONS  (PRN):  acetaminophen     Tablet .. 650 milliGRAM(s) Oral every 6 hours PRN Temp greater or equal to 38C (100.4F), Mild Pain (1 - 3)  trimethobenzamide Injectable 200 milliGRAM(s) IntraMuscular every 6 hours PRN Nausea and/or Vomiting      No Known Allergies      LABS                        10.8   12.23 )-----------( 164      ( 11 May 2024 04:28 )             32.9     05-11    133<L>  |  104  |  17  ----------------------------<  141<H>  4.2   |  19<L>  |  1.17    Ca    8.2<L>      11 May 2024 04:28  Phos  1.7     05-11  Mg     2.7     05-11    TPro  5.9<L>  /  Alb  2.7<L>  /  TBili  0.5  /  DBili  x   /  AST  43<H>  /  ALT  23  /  AlkPhos  117  05-11    LIVER FUNCTIONS - ( 11 May 2024 04:28 )  Alb: 2.7 g/dL / Pro: 5.9 g/dL / ALK PHOS: 117 U/L / ALT: 23 U/L / AST: 43 U/L / GGT: x           PT/INR - ( 09 May 2024 21:23 )   PT: 13.1 sec;   INR: 1.15          PTT - ( 09 May 2024 21:23 )  PTT:24.4 sec  Urinalysis Basic - ( 11 May 2024 04:28 )    Color: x / Appearance: x / SG: x / pH: x  Gluc: 141 mg/dL / Ketone: x  / Bili: x / Urobili: x   Blood: x / Protein: x / Nitrite: x   Leuk Esterase: x / RBC: x / WBC x   Sq Epi: x / Non Sq Epi: x / Bacteria: x      ABG - ( 10 May 2024 01:18 )  pH, Arterial: 7.38  pH, Blood: x     /  pCO2: 31    /  pO2: 111   / HCO3: 18    / Base Excess: -5.7  /  SaO2: 98.7        CARDIAC MARKERS ( 10 May 2024 09:01 )  x     / x     / 1357 U/L / x     / x      CARDIAC MARKERS ( 10 May 2024 01:18 )  x     / x     / 1472 U/L / x     / 6.2 ng/mL      Urinalysis Basic - ( 11 May 2024 04:28 )    Color: x / Appearance: x / SG: x / pH: x  Gluc: 141 mg/dL / Ketone: x  / Bili: x / Urobili: x   Blood: x / Protein: x / Nitrite: x   Leuk Esterase: x / RBC: x / WBC x   Sq Epi: x / Non Sq Epi: x / Bacteria: x      Culture - Blood (collected 05-10-24 @ 17:50)  Source: .Blood Blood  Gram Stain (05-11-24 @ 09:50):    Growth in aerobic bottle: Gram Negative Rods  Preliminary Report (05-11-24 @ 09:50):    Growth in aerobic bottle: Gram Negative Rods    IMAGING/EKG/ETC   ***** STEP DOWN FROM MICU TO RMF *****    HOSPITAL COURSE:  74 year old F with PMHx of HTN, pre-DM, CAD s/p CABG, diverticulitis with colovesicular fistula s/p sigmoidectomy and bladder tumor resection in 2015, pshx L hip replacement 2017, R knee replacement 2016, presenting with one week hx of nausea, vomiting, decreased PO intake, and fever, admitted to MICU for hypervolemic shock of unknown etiology, hypokalemia, and JUAN CARLOS. Started on minimal pressors and weaned off. Blood cultures positive for gram negative rods. Being treated with Zosyn pseudomonal dosing. Legionella negative. Bilateral consolidations on bedside POCUS. Patient off pressors and stable for stepdown to RMF.     Subjective/ROS: Patient seen and examined at bedside.     Denies Fever/Chills, HA, CP, SOB, n/v, changes in bowel/urinary habits.  12pt ROS otherwise negative.    VITALS  Vital Signs Last 24 Hrs  T(C): 38.9 (11 May 2024 11:00), Max: 39.4 (10 May 2024 19:00)  T(F): 102 (11 May 2024 11:00), Max: 103 (10 May 2024 19:00)  HR: 69 (11 May 2024 14:00) (57 - 83)  BP: 115/56 (11 May 2024 14:00) (96/48 - 161/64)  BP(mean): 81 (11 May 2024 14:00) (67 - 109)  RR: 27 (11 May 2024 14:00) (14 - 31)  SpO2: 96% (11 May 2024 14:00) (91% - 100%)    Parameters below as of 11 May 2024 14:00  Patient On (Oxygen Delivery Method): room air    CAPILLARY BLOOD GLUCOSE    PHYSICAL EXAM  General: NAD, sitting up in a chair, reports tolerating PO  Head: NC/AT; MMM   Respiratory: CTAB, however patient struggling to take deep breaths, mainly taking shallow breaths b/l   Cardiovascular: Regular rhythm/rate  Gastrointestinal: Soft; NTND  Genitourinary: no suprapubic tenderness   Extremities: WWP; no edema/cyanosis in the UE or LE b/l   Neurological: A&Ox3, CNII-XII grossly intact; no obvious focal deficits    MEDICATIONS  (STANDING):  aspirin  chewable 81 milliGRAM(s) Oral daily  atorvastatin 10 milliGRAM(s) Oral at bedtime  clopidogrel Tablet 75 milliGRAM(s) Oral daily  heparin   Injectable 5000 Unit(s) SubCutaneous every 8 hours  pantoprazole  Injectable 40 milliGRAM(s) IV Push every 24 hours  piperacillin/tazobactam IVPB.. 4.5 Gram(s) IV Intermittent every 8 hours    MEDICATIONS  (PRN):  acetaminophen     Tablet .. 650 milliGRAM(s) Oral every 6 hours PRN Temp greater or equal to 38C (100.4F), Mild Pain (1 - 3)  trimethobenzamide Injectable 200 milliGRAM(s) IntraMuscular every 6 hours PRN Nausea and/or Vomiting    No Known Allergies      LABS                        10.8   12.23 )-----------( 164      ( 11 May 2024 04:28 )             32.9     05-11    133<L>  |  104  |  17  ----------------------------<  141<H>  4.2   |  19<L>  |  1.17    Ca    8.2<L>      11 May 2024 04:28  Phos  1.7     05-11  Mg     2.7     05-11    TPro  5.9<L>  /  Alb  2.7<L>  /  TBili  0.5  /  DBili  x   /  AST  43<H>  /  ALT  23  /  AlkPhos  117  05-11    LIVER FUNCTIONS - ( 11 May 2024 04:28 )  Alb: 2.7 g/dL / Pro: 5.9 g/dL / ALK PHOS: 117 U/L / ALT: 23 U/L / AST: 43 U/L / GGT: x           PT/INR - ( 09 May 2024 21:23 )   PT: 13.1 sec;   INR: 1.15          PTT - ( 09 May 2024 21:23 )  PTT:24.4 sec  Urinalysis Basic - ( 11 May 2024 04:28 )    Color: x / Appearance: x / SG: x / pH: x  Gluc: 141 mg/dL / Ketone: x  / Bili: x / Urobili: x   Blood: x / Protein: x / Nitrite: x   Leuk Esterase: x / RBC: x / WBC x   Sq Epi: x / Non Sq Epi: x / Bacteria: x      ABG - ( 10 May 2024 01:18 )  pH, Arterial: 7.38  pH, Blood: x     /  pCO2: 31    /  pO2: 111   / HCO3: 18    / Base Excess: -5.7  /  SaO2: 98.7        CARDIAC MARKERS ( 10 May 2024 09:01 )  x     / x     / 1357 U/L / x     / x      CARDIAC MARKERS ( 10 May 2024 01:18 )  x     / x     / 1472 U/L / x     / 6.2 ng/mL      Urinalysis Basic - ( 11 May 2024 04:28 )    Color: x / Appearance: x / SG: x / pH: x  Gluc: 141 mg/dL / Ketone: x  / Bili: x / Urobili: x   Blood: x / Protein: x / Nitrite: x   Leuk Esterase: x / RBC: x / WBC x   Sq Epi: x / Non Sq Epi: x / Bacteria: x      Culture - Blood (collected 05-10-24 @ 17:50)  Source: .Blood Blood  Gram Stain (05-11-24 @ 09:50):    Growth in aerobic bottle: Gram Negative Rods  Preliminary Report (05-11-24 @ 09:50):    Growth in aerobic bottle: Gram Negative Rods    IMAGING/EKG/ETC   ***** STEP DOWN FROM MICU TO RMF *****    HOSPITAL COURSE:  74 year old F with PMHx of HTN, pre-DM, CAD s/p CABG, diverticulitis with colovesicular fistula s/p sigmoidectomy and bladder tumor resection in 2015, pshx L hip replacement 2017, R knee replacement 2016, presenting with one week hx of nausea, vomiting, decreased PO intake, and fever (Tmax 103), admitted to MICU for hypervolemic vs septic shock, hypokalemia, and JUAN CARLOS. Started on minimal pressors but weaned off the next day. Patient s/p 1g meropenem, however ID approval recommended deescalating to zosyn. Started on zosyn pseudomonal dosing iso BCx positive for gram negative rods, but eventually decreased back to zosyn 3.375g q8hrs given speciation E. coli bacteremia. Legionella negative. Patient also noted to have b/l consolidations on bedside POCUS. Patient stable off pressors, tolerating OOBTC, stepped down to RMF.     Subjective/ROS: Patient seen and examined at bedside.     Denies Fever/Chills, HA, CP, SOB, n/v, changes in bowel/urinary habits.  12pt ROS otherwise negative.    VITALS  Vital Signs Last 24 Hrs  T(C): 38.9 (11 May 2024 11:00), Max: 39.4 (10 May 2024 19:00)  T(F): 102 (11 May 2024 11:00), Max: 103 (10 May 2024 19:00)  HR: 69 (11 May 2024 14:00) (57 - 83)  BP: 115/56 (11 May 2024 14:00) (96/48 - 161/64)  BP(mean): 81 (11 May 2024 14:00) (67 - 109)  RR: 27 (11 May 2024 14:00) (14 - 31)  SpO2: 96% (11 May 2024 14:00) (91% - 100%)    Parameters below as of 11 May 2024 14:00  Patient On (Oxygen Delivery Method): room air    CAPILLARY BLOOD GLUCOSE    PHYSICAL EXAM  General: NAD, sitting up in a chair, reports tolerating PO  Head: NC/AT; MMM   Respiratory: CTAB, however patient struggling to take deep breaths, mainly taking shallow breaths b/l   Cardiovascular: Regular rhythm/rate  Gastrointestinal: Soft; NTND  Genitourinary: no suprapubic tenderness   Extremities: WWP; no edema/cyanosis in the UE or LE b/l   Neurological: A&Ox3, CNII-XII grossly intact; no obvious focal deficits    MEDICATIONS  (STANDING):  aspirin  chewable 81 milliGRAM(s) Oral daily  atorvastatin 10 milliGRAM(s) Oral at bedtime  clopidogrel Tablet 75 milliGRAM(s) Oral daily  heparin   Injectable 5000 Unit(s) SubCutaneous every 8 hours  pantoprazole  Injectable 40 milliGRAM(s) IV Push every 24 hours  piperacillin/tazobactam IVPB.. 4.5 Gram(s) IV Intermittent every 8 hours    MEDICATIONS  (PRN):  acetaminophen     Tablet .. 650 milliGRAM(s) Oral every 6 hours PRN Temp greater or equal to 38C (100.4F), Mild Pain (1 - 3)  trimethobenzamide Injectable 200 milliGRAM(s) IntraMuscular every 6 hours PRN Nausea and/or Vomiting    No Known Allergies      LABS                        10.8   12.23 )-----------( 164      ( 11 May 2024 04:28 )             32.9     05-11    133<L>  |  104  |  17  ----------------------------<  141<H>  4.2   |  19<L>  |  1.17    Ca    8.2<L>      11 May 2024 04:28  Phos  1.7     05-11  Mg     2.7     05-11    TPro  5.9<L>  /  Alb  2.7<L>  /  TBili  0.5  /  DBili  x   /  AST  43<H>  /  ALT  23  /  AlkPhos  117  05-11    LIVER FUNCTIONS - ( 11 May 2024 04:28 )  Alb: 2.7 g/dL / Pro: 5.9 g/dL / ALK PHOS: 117 U/L / ALT: 23 U/L / AST: 43 U/L / GGT: x           PT/INR - ( 09 May 2024 21:23 )   PT: 13.1 sec;   INR: 1.15          PTT - ( 09 May 2024 21:23 )  PTT:24.4 sec  Urinalysis Basic - ( 11 May 2024 04:28 )    Color: x / Appearance: x / SG: x / pH: x  Gluc: 141 mg/dL / Ketone: x  / Bili: x / Urobili: x   Blood: x / Protein: x / Nitrite: x   Leuk Esterase: x / RBC: x / WBC x   Sq Epi: x / Non Sq Epi: x / Bacteria: x      ABG - ( 10 May 2024 01:18 )  pH, Arterial: 7.38  pH, Blood: x     /  pCO2: 31    /  pO2: 111   / HCO3: 18    / Base Excess: -5.7  /  SaO2: 98.7        CARDIAC MARKERS ( 10 May 2024 09:01 )  x     / x     / 1357 U/L / x     / x      CARDIAC MARKERS ( 10 May 2024 01:18 )  x     / x     / 1472 U/L / x     / 6.2 ng/mL      Urinalysis Basic - ( 11 May 2024 04:28 )    Color: x / Appearance: x / SG: x / pH: x  Gluc: 141 mg/dL / Ketone: x  / Bili: x / Urobili: x   Blood: x / Protein: x / Nitrite: x   Leuk Esterase: x / RBC: x / WBC x   Sq Epi: x / Non Sq Epi: x / Bacteria: x      Culture - Blood (collected 05-10-24 @ 17:50)  Source: .Blood Blood  Gram Stain (05-11-24 @ 09:50):    Growth in aerobic bottle: Gram Negative Rods  Preliminary Report (05-11-24 @ 09:50):    Growth in aerobic bottle: Gram Negative Rods    IMAGING/EKG/ETC

## 2024-05-11 NOTE — PROGRESS NOTE ADULT - PROBLEM SELECTOR PLAN 1
#Sepsis 2/2 E. coli bacteremia   Patient initially presenting with septic vs. hypovolemic shock, meeting 2/4 SIRS with T 101.5 and leukocytosis WBC 19 with end organ damage- JUAN CARLOS and AMS, lactate 1.4. s/p 5L of fluid resuscitation on admission to the MICU, also requiring pressors. Causes of shock attributed to either hypovolemia 2/2 poor PO intake vs septic shock 2/2 likely to UTI, UA positive on admission, vs less likely GI source patient with n/v without abdominal tenderness or diarrhea, vs less likely of pulmonary etiology given CXR clear, patient without SOB or cough. Patient weaned off levophed in the MICU  BCx drawn on admission growing gram negative rods, E. coli    - s/p vanc 1g, zosyn 3.375g in the ED  - patient received zosyn 3.375g q8 in the MICU, was given one dose of meropenem 1g x1 on 5/10 after spiking Tmax of 103. ID approval recommended deescalating to zosyn for now given overall downtrend in fever curve, recommending escalation to meropenem only if new Tmax >103. Patient deescalated back to zosyn but 4.5g q8hrs due to gram negative bacteremia (initial concern for pseudomonas).   - c/w zosyn 3.375g q8, Cx now growing E. coli   - WBC downtrending 19-->12  - f/u surveillance Cx   - f/u culture sensitivities   - UCx negative

## 2024-05-11 NOTE — PROGRESS NOTE ADULT - PROBLEM SELECTOR PLAN 5
Patient with b/l consolidations on bedside POCUS in the MICU. Also with poor inspiratory effort, mainly taking shallow breaths, no wheezing, rales, or ronchi. CXR on admission showing low frontal lung volumes.     - chest PT  - incentive spirometry  - OOBTC

## 2024-05-11 NOTE — PROGRESS NOTE ADULT - ASSESSMENT
Positive blood cultures identification and sensitivities pending  Discussed with ICU team patient and her

## 2024-05-11 NOTE — PROGRESS NOTE ADULT - PROBLEM SELECTOR PLAN 6
Patient on lisinopril 5mg qd, verapamil 120mg BID, furosemide 20mg qd, and nitroglycerine patch 0.4 at home.     - holding anti-HTN meds iso septic vs hypovolemic shock requiring pressors  - monitor BP  - can resum Patient on lisinopril 5mg qd, verapamil 120mg BID, furosemide 20mg qd, and nitroglycerine patch 0.4 at home.     - holding anti-HTN meds iso septic vs hypovolemic shock requiring pressors  - monitor BP  - can resume anti-HTN meds gradually

## 2024-05-11 NOTE — PROGRESS NOTE ADULT - PROBLEM SELECTOR PLAN 2
Patient presenting with oliguric prerenal JUAN CARLOS iso nausea/vomiting and poor PO intake. Cr on admission 2.22, BUN 34   (Baseline Cr 0.93 in January 2024)    RESOLVED    - Cr 1.17 on 5/11  - Monitor sCr and avoid nephrotoxins Patient presenting with oliguric prerenal JUAN CARLOS iso nausea/vomiting and poor PO intake. Cr on admission 2.22, BUN 34   (Baseline Cr 0.93 in January 2024)    - RESOLVED  - Cr 1.17 on 5/11  - Monitor sCr and avoid nephrotoxins

## 2024-05-11 NOTE — PROGRESS NOTE ADULT - SUBJECTIVE AND OBJECTIVE BOX
***** STEP DOWN FROM MICU TO RMF *****    HOSPITAL COURSE:  74 year old female with PMHx of HTN, pre-DM, CAD s/p CABG, diverticulitis with colovesicular fistula s/p sigmoidectomy and bladder tumor resection in 2015, presenting with one week of nausea, vomiting, decreased PO intake, and fever, admitted to MICU for Hyperdynamic shock of unknown etiology, hypokalemia, and JUAN CARLOS. Started on minimal pressors and weaned off. Blood cultures positive for gram negative rods. Being treated with Zosyn pseudomonal dosing. Legionella negative. Bilateral consolidations on bedside POCUS. Patient off pressors and stable for stepdown to RMF.     INTERVAL HPI/OVERNIGHT EVENTS:  Legionella negative. Gave 30mmol of Na phosphate in AM. Fever to 101.5 in AM, gave tylenol.     SUBJECTIVE: Patient seen and examined at bedside. States she had a fever overnight. Denies chills, headache, chest pain, shortness of breath, abdominal pain.     ROS: All negative except as listed above.    VITAL SIGNS:  ICU Vital Signs Last 24 Hrs  T(C): 38.9 (11 May 2024 11:00), Max: 39.4 (10 May 2024 19:00)  T(F): 102 (11 May 2024 11:00), Max: 103 (10 May 2024 19:00)  HR: 76 (11 May 2024 12:09) (53 - 83)  BP: 115/82 (11 May 2024 12:09) (96/48 - 161/64)  BP(mean): 94 (11 May 2024 12:09) (67 - 109)  ABP: --  ABP(mean): --  RR: 27 (11 May 2024 12:09) (14 - 31)  SpO2: 99% (11 May 2024 12:09) (91% - 100%)    O2 Parameters below as of 11 May 2024 12:09  Patient On (Oxygen Delivery Method): room air    Plateau pressure:   P/F ratio:     05-10 @ 07:01  -  05-11 @ 07:00  --------------------------------------------------------  IN: 919.9 mL / OUT: 2200 mL / NET: -1280.1 mL    05-11 @ 07:01  -  05-11 @ 13:38  --------------------------------------------------------  IN: 249.9 mL / OUT: 500 mL / NET: -250.1 mL      CAPILLARY BLOOD GLUCOSE  POCT Blood Glucose.: 114 mg/dL (09 May 2024 21:11)    ECG: reviewed.    PHYSICAL EXAM:  General: NAD; speaking in full sentences  HEENT: NC/AT; PERRL; EOMI; MMM  Neck: supple; no JVD  Cardiac: RRR; +S1/S2  Pulm: CTA B/L; no W/R/R  GI: soft, NT/ND, +BS  Extremities: WWP; no edema, clubbing or cyanosis  Vasc: 2+ radial, DP pulses B/L  Neuro: AAOx3; no focal deficits    MEDICATIONS  (STANDING):  aspirin  chewable 81 milliGRAM(s) Oral daily  atorvastatin 10 milliGRAM(s) Oral at bedtime  clopidogrel Tablet 75 milliGRAM(s) Oral daily  heparin   Injectable 5000 Unit(s) SubCutaneous every 8 hours  pantoprazole  Injectable 40 milliGRAM(s) IV Push every 24 hours    MEDICATIONS  (PRN):  acetaminophen     Tablet .. 650 milliGRAM(s) Oral every 6 hours PRN Temp greater or equal to 38C (100.4F), Mild Pain (1 - 3)  trimethobenzamide Injectable 200 milliGRAM(s) IntraMuscular every 6 hours PRN Nausea and/or Vomiting      ALLERGIES:  No Known Allergies    LABS:                        10.8   12.23 )-----------( 164      ( 11 May 2024 04:28 )             32.9     05-11    133<L>  |  104  |  17  ----------------------------<  141<H>  4.2   |  19<L>  |  1.17    Ca    8.2<L>      11 May 2024 04:28  Phos  1.7     05-11  Mg     2.7     05-11    TPro  5.9<L>  /  Alb  2.7<L>  /  TBili  0.5  /  DBili  x   /  AST  43<H>  /  ALT  23  /  AlkPhos  117  05-11    PT/INR - ( 09 May 2024 21:23 )   PT: 13.1 sec;   INR: 1.15          PTT - ( 09 May 2024 21:23 )  PTT:24.4 sec  Urinalysis Basic - ( 11 May 2024 04:28 )    Color: x / Appearance: x / SG: x / pH: x  Gluc: 141 mg/dL / Ketone: x  / Bili: x / Urobili: x   Blood: x / Protein: x / Nitrite: x   Leuk Esterase: x / RBC: x / WBC x   Sq Epi: x / Non Sq Epi: x / Bacteria: x    Micro:    Culture - Blood (collected 05-10-24 @ 17:50)  Source: .Blood Blood  Gram Stain (05-11-24 @ 09:50):    Growth in aerobic bottle: Gram Negative Rods  Preliminary Report (05-11-24 @ 09:50):    Growth in aerobic bottle: Gram Negative Rods    Culture - Blood (collected 05-10-24 @ 02:01)  Source: .Blood Blood  Gram Stain (05-10-24 @ 17:45):    Growth in aerobic and anaerobic bottles: Gram Negative Rods  Preliminary Report (05-10-24 @ 17:45):    Growth in aerobic and anaerobic bottles: Gram Negative Rods    Culture - Blood (collected 05-10-24 @ 02:01)  Source: .Blood Blood  Gram Stain (05-10-24 @ 17:44):    Growth in anaerobic bottle: Gram Negative Rods    Growth in aerobic bottle: Gram Negative Rods  Preliminary Report (05-10-24 @ 17:45):    Growth in anaerobic bottle: Gram Negative Rods    Growth in aerobic bottle: Gram Negative Rods    Direct identification is available within approximately 3-5    hours either by Blood Panel Multiplexed PCR or Direct    MALDI-TOF. Details: [email or web address]  Organism: Blood Culture PCR (05-10-24 @ 20:45)  Organism: Blood Culture PCR (05-10-24 @ 20:45)      Method Type: PCR      -  Escherichia coli: Detec       Urinalysis with Rflx Culture (collected 05-10-24 @ 03:45)         RADIOLOGY & ADDITIONAL TESTS: Reviewed.

## 2024-05-11 NOTE — PROGRESS NOTE ADULT - ASSESSMENT
74 F with HTN, CAD s/p CABG, pre-DM, diverticulitis with colovesical fistula s/p sigmoidectomy and bladder tumor resection in 2015, presenting with one week of nausea, vomiting, decreased PO intake, and fever. Patient began having nausea and vomiting (liquid, non-bloody or black) about one week ago. Physical exam as above. CT head was unremarkable. Admitted for hypovolemic shock, severe dehydration, JUAN CARLOS, and electrolyte abnormalities  Currently being treated with iv fluid resuscitation, wean norepinephrine, replace electrolytes .

## 2024-05-12 ENCOUNTER — TRANSCRIPTION ENCOUNTER (OUTPATIENT)
Age: 74
End: 2024-05-12

## 2024-05-12 LAB
-  AMPICILLIN/SULBACTAM: SIGNIFICANT CHANGE UP
-  AMPICILLIN: SIGNIFICANT CHANGE UP
-  CEFAZOLIN: SIGNIFICANT CHANGE UP
-  CEFEPIME: SIGNIFICANT CHANGE UP
-  CEFOXITIN: SIGNIFICANT CHANGE UP
-  CEFTRIAXONE: SIGNIFICANT CHANGE UP
-  CIPROFLOXACIN: SIGNIFICANT CHANGE UP
-  GENTAMICIN: SIGNIFICANT CHANGE UP
-  LEVOFLOXACIN: SIGNIFICANT CHANGE UP
-  PIPERACILLIN/TAZOBACTAM: SIGNIFICANT CHANGE UP
-  TOBRAMYCIN: SIGNIFICANT CHANGE UP
-  TRIMETHOPRIM/SULFAMETHOXAZOLE: SIGNIFICANT CHANGE UP
ALBUMIN SERPL ELPH-MCNC: 2.8 G/DL — LOW (ref 3.3–5)
ALP SERPL-CCNC: 164 U/L — HIGH (ref 40–120)
ALT FLD-CCNC: 34 U/L — SIGNIFICANT CHANGE UP (ref 10–45)
ANION GAP SERPL CALC-SCNC: 12 MMOL/L — SIGNIFICANT CHANGE UP (ref 5–17)
AST SERPL-CCNC: 45 U/L — HIGH (ref 10–40)
BASOPHILS # BLD AUTO: 0.02 K/UL — SIGNIFICANT CHANGE UP (ref 0–0.2)
BASOPHILS NFR BLD AUTO: 0.2 % — SIGNIFICANT CHANGE UP (ref 0–2)
BILIRUB SERPL-MCNC: 0.4 MG/DL — SIGNIFICANT CHANGE UP (ref 0.2–1.2)
BLD GP AB SCN SERPL QL: NEGATIVE — SIGNIFICANT CHANGE UP
BUN SERPL-MCNC: 15 MG/DL — SIGNIFICANT CHANGE UP (ref 7–23)
CALCIUM SERPL-MCNC: 8.2 MG/DL — LOW (ref 8.4–10.5)
CHLORIDE SERPL-SCNC: 104 MMOL/L — SIGNIFICANT CHANGE UP (ref 96–108)
CO2 SERPL-SCNC: 22 MMOL/L — SIGNIFICANT CHANGE UP (ref 22–31)
CREAT SERPL-MCNC: 1.1 MG/DL — SIGNIFICANT CHANGE UP (ref 0.5–1.3)
CULTURE RESULTS: ABNORMAL
EGFR: 53 ML/MIN/1.73M2 — LOW
EOSINOPHIL # BLD AUTO: 0.12 K/UL — SIGNIFICANT CHANGE UP (ref 0–0.5)
EOSINOPHIL NFR BLD AUTO: 1.3 % — SIGNIFICANT CHANGE UP (ref 0–6)
GLUCOSE SERPL-MCNC: 131 MG/DL — HIGH (ref 70–99)
HCT VFR BLD CALC: 31.7 % — LOW (ref 34.5–45)
HGB BLD-MCNC: 10.1 G/DL — LOW (ref 11.5–15.5)
IMM GRANULOCYTES NFR BLD AUTO: 0.3 % — SIGNIFICANT CHANGE UP (ref 0–0.9)
LYMPHOCYTES # BLD AUTO: 1.09 K/UL — SIGNIFICANT CHANGE UP (ref 1–3.3)
LYMPHOCYTES # BLD AUTO: 11.5 % — LOW (ref 13–44)
MAGNESIUM SERPL-MCNC: 2 MG/DL — SIGNIFICANT CHANGE UP (ref 1.6–2.6)
MCHC RBC-ENTMCNC: 29.2 PG — SIGNIFICANT CHANGE UP (ref 27–34)
MCHC RBC-ENTMCNC: 31.9 GM/DL — LOW (ref 32–36)
MCV RBC AUTO: 91.6 FL — SIGNIFICANT CHANGE UP (ref 80–100)
METHOD TYPE: SIGNIFICANT CHANGE UP
MONOCYTES # BLD AUTO: 0.85 K/UL — SIGNIFICANT CHANGE UP (ref 0–0.9)
MONOCYTES NFR BLD AUTO: 9 % — SIGNIFICANT CHANGE UP (ref 2–14)
NEUTROPHILS # BLD AUTO: 7.36 K/UL — SIGNIFICANT CHANGE UP (ref 1.8–7.4)
NEUTROPHILS NFR BLD AUTO: 77.7 % — HIGH (ref 43–77)
NRBC # BLD: 0 /100 WBCS — SIGNIFICANT CHANGE UP (ref 0–0)
ORGANISM # SPEC MICROSCOPIC CNT: ABNORMAL
ORGANISM # SPEC MICROSCOPIC CNT: ABNORMAL
ORGANISM # SPEC MICROSCOPIC CNT: SIGNIFICANT CHANGE UP
PHOSPHATE SERPL-MCNC: 2.7 MG/DL — SIGNIFICANT CHANGE UP (ref 2.5–4.5)
PLATELET # BLD AUTO: 162 K/UL — SIGNIFICANT CHANGE UP (ref 150–400)
POTASSIUM SERPL-MCNC: 3.8 MMOL/L — SIGNIFICANT CHANGE UP (ref 3.5–5.3)
POTASSIUM SERPL-SCNC: 3.8 MMOL/L — SIGNIFICANT CHANGE UP (ref 3.5–5.3)
PROT SERPL-MCNC: 5.8 G/DL — LOW (ref 6–8.3)
RBC # BLD: 3.46 M/UL — LOW (ref 3.8–5.2)
RBC # FLD: 13.8 % — SIGNIFICANT CHANGE UP (ref 10.3–14.5)
RH IG SCN BLD-IMP: POSITIVE — SIGNIFICANT CHANGE UP
SODIUM SERPL-SCNC: 138 MMOL/L — SIGNIFICANT CHANGE UP (ref 135–145)
SPECIMEN SOURCE: SIGNIFICANT CHANGE UP
WBC # BLD: 9.47 K/UL — SIGNIFICANT CHANGE UP (ref 3.8–10.5)
WBC # FLD AUTO: 9.47 K/UL — SIGNIFICANT CHANGE UP (ref 3.8–10.5)

## 2024-05-12 PROCEDURE — 71045 X-RAY EXAM CHEST 1 VIEW: CPT | Mod: 26

## 2024-05-12 RX ORDER — CEFTRIAXONE 500 MG/1
2000 INJECTION, POWDER, FOR SOLUTION INTRAMUSCULAR; INTRAVENOUS EVERY 24 HOURS
Refills: 0 | Status: DISCONTINUED | OUTPATIENT
Start: 2024-05-12 | End: 2024-05-13

## 2024-05-12 RX ORDER — VERAPAMIL HCL 240 MG
120 CAPSULE, EXTENDED RELEASE PELLETS 24 HR ORAL
Refills: 0 | Status: DISCONTINUED | OUTPATIENT
Start: 2024-05-12 | End: 2024-05-14

## 2024-05-12 RX ORDER — IPRATROPIUM/ALBUTEROL SULFATE 18-103MCG
3 AEROSOL WITH ADAPTER (GRAM) INHALATION EVERY 6 HOURS
Refills: 0 | Status: DISCONTINUED | OUTPATIENT
Start: 2024-05-13 | End: 2024-05-14

## 2024-05-12 RX ORDER — VERAPAMIL HCL 240 MG
120 CAPSULE, EXTENDED RELEASE PELLETS 24 HR ORAL
Refills: 0 | Status: DISCONTINUED | OUTPATIENT
Start: 2024-05-12 | End: 2024-05-12

## 2024-05-12 RX ORDER — LISINOPRIL 2.5 MG/1
5 TABLET ORAL DAILY
Refills: 0 | Status: DISCONTINUED | OUTPATIENT
Start: 2024-05-13 | End: 2024-05-14

## 2024-05-12 RX ORDER — IPRATROPIUM/ALBUTEROL SULFATE 18-103MCG
3 AEROSOL WITH ADAPTER (GRAM) INHALATION ONCE
Refills: 0 | Status: COMPLETED | OUTPATIENT
Start: 2024-05-12 | End: 2024-05-12

## 2024-05-12 RX ORDER — LISINOPRIL 2.5 MG/1
5 TABLET ORAL DAILY
Refills: 0 | Status: DISCONTINUED | OUTPATIENT
Start: 2024-05-12 | End: 2024-05-12

## 2024-05-12 RX ADMIN — Medication 3 MILLILITER(S): at 17:08

## 2024-05-12 RX ADMIN — HEPARIN SODIUM 5000 UNIT(S): 5000 INJECTION INTRAVENOUS; SUBCUTANEOUS at 22:05

## 2024-05-12 RX ADMIN — LISINOPRIL 5 MILLIGRAM(S): 2.5 TABLET ORAL at 15:19

## 2024-05-12 RX ADMIN — HEPARIN SODIUM 5000 UNIT(S): 5000 INJECTION INTRAVENOUS; SUBCUTANEOUS at 05:44

## 2024-05-12 RX ADMIN — ATORVASTATIN CALCIUM 10 MILLIGRAM(S): 80 TABLET, FILM COATED ORAL at 22:06

## 2024-05-12 RX ADMIN — PIPERACILLIN AND TAZOBACTAM 25 GRAM(S): 4; .5 INJECTION, POWDER, LYOPHILIZED, FOR SOLUTION INTRAVENOUS at 05:44

## 2024-05-12 RX ADMIN — HEPARIN SODIUM 5000 UNIT(S): 5000 INJECTION INTRAVENOUS; SUBCUTANEOUS at 13:01

## 2024-05-12 RX ADMIN — PANTOPRAZOLE SODIUM 40 MILLIGRAM(S): 20 TABLET, DELAYED RELEASE ORAL at 13:02

## 2024-05-12 RX ADMIN — Medication 120 MILLIGRAM(S): at 11:24

## 2024-05-12 RX ADMIN — CLOPIDOGREL BISULFATE 75 MILLIGRAM(S): 75 TABLET, FILM COATED ORAL at 13:02

## 2024-05-12 RX ADMIN — CEFTRIAXONE 100 MILLIGRAM(S): 500 INJECTION, POWDER, FOR SOLUTION INTRAMUSCULAR; INTRAVENOUS at 13:01

## 2024-05-12 RX ADMIN — Medication 81 MILLIGRAM(S): at 13:02

## 2024-05-12 NOTE — DISCHARGE NOTE PROVIDER - NSDCMRMEDTOKEN_GEN_ALL_CORE_FT
aspirin 81 mg oral tablet: 1 tab(s) orally once a day  Lasix 20 mg oral tablet: 1 tab(s) orally once a day  Livalo 2 mg oral tablet: 1 tab(s) orally once a day  metFORMIN 500 mg oral tablet: 1 tab(s) orally 2 times a day  Nitro-Dur 0.4 mg/hr transdermal film, extended release: 1 patch transdermal once a day  Pepcid 20 mg oral tablet: 1 tab(s) orally 2 times a day  Plavix 75 mg oral tablet: 1 tab(s) orally once a day  verapamil 120 mg/12 hours oral tablet, extended release: 1 tab(s) orally 2 times a day   aspirin 81 mg oral tablet: 1 tab(s) orally once a day  benzonatate 100 mg oral capsule: 1 cap(s) orally 3 times a day  cefpodoxime 200 mg oral tablet: 1 tab(s) orally every 12 hours  Livalo 2 mg oral tablet: 1 tab(s) orally once a day  metFORMIN 500 mg oral tablet: 1 tab(s) orally 2 times a day  Pepcid 20 mg oral tablet: 1 tab(s) orally 2 times a day  Plavix 75 mg oral tablet: 1 tab(s) orally once a day  verapamil 120 mg/12 hours oral tablet, extended release: 1 tab(s) orally 2 times a day

## 2024-05-12 NOTE — PROGRESS NOTE ADULT - ASSESSMENT
74 F with HTN, CAD s/p CABG, pre-DM, diverticulitis with colovesical fistula s/p sigmoidectomy and bladder tumor resection in 2015, presenting with one week of nausea, vomiting, decreased PO intake, and fever. Patient began having nausea and vomiting (liquid, non-bloody or black) about one week ago. Physical exam as above. CT head was unremarkable. Admitted for hypovolemic shock, severe dehydration, JUAN CARLOS, and electrolyte abnormalities. Admitted to MICU for pressor support now off norepinephrine stepped down to RMF for continued medical management.

## 2024-05-12 NOTE — PROGRESS NOTE ADULT - PROBLEM SELECTOR PLAN 2
Patient presenting with oliguric prerenal JUAN CARLSO iso nausea/vomiting and poor PO intake. Cr on admission 2.22, BUN 34   (Baseline Cr 0.93 in January 2024)    - RESOLVED  - Cr 1.17 on 5/11  - Monitor sCr and avoid nephrotoxins

## 2024-05-12 NOTE — DISCHARGE NOTE PROVIDER - PROVIDER TOKENS
PROVIDER:[TOKEN:[[id number]],FOLLOWUP:[2 weeks]] PROVIDER:[TOKEN:[[id number]],SCHEDULEDAPPT:[05/20/2024],SCHEDULEDAPPTTIME:[01:00 PM],ESTABLISHEDPATIENT:[T]]

## 2024-05-12 NOTE — PROGRESS NOTE ADULT - PROBLEM SELECTOR PLAN 6
Patient on lisinopril 5mg qd, verapamil 120mg BID, furosemide 20mg qd, and nitroglycerine patch 0.4 at home.     - holding anti-HTN meds iso septic vs hypovolemic shock requiring pressors  - monitor BP  - can resume anti-HTN meds gradually Patient on lisinopril 5mg qd, verapamil 120mg BID, furosemide 20mg qd, and nitroglycerine patch 0.4 at home. Anti-HTN meds held i/s/o sepsis vs. hypovolemic shock requiring pressors. However, today with /80.     - restart home verapamil 120mg BID and lisinopril 5mg QD   - monitor BP  - can resume anti-HTN meds gradually

## 2024-05-12 NOTE — DISCHARGE NOTE PROVIDER - NSDCCPCAREPLAN_GEN_ALL_CORE_FT
PRINCIPAL DISCHARGE DIAGNOSIS  Diagnosis: Septic shock  Assessment and Plan of Treatment: Sepsis is a a life-threatening complication of an infection. Sepsis occurs when chemicals released in the bloodstream to fight an infection trigger inflammation throughout the body. This can cause a cascade of changes that damage multiple organ systems, leading them to fail, sometimes even resulting in death. Symptoms include fever, difficulty breathing, low blood pressure, fast heart rate, and mental confusion. In your case, your sepsis was due to bacteria that was found growing in the blood. The name of the bacteria is E. coli and it was treated in the hospital with IV antibiotics. You were monitored during your time in the hospital and had clinical improvement with improvement in your blood pressure. You remained without fever and had repeat blood cultures sent which did not grow any more bacteria. You are medically ready to leave the hospital but will need to continue taking cefpodoxime upon discharge. PLEASE TAKE CEFPODOXIME 200MG TWICE DAILY FOR THREE DAYS THROUGH 05/16. Please follow-up with your primary care provider within 1-2 weeks after discharge. You have an appointment set up with Dr. Marie on Monday, May 20th at 1pm.      SECONDARY DISCHARGE DIAGNOSES  Diagnosis: Hypertension  Assessment and Plan of Treatment: You have a history of hypertension for which you are on home medications of verapamil, lisinopril, furosemide, and nitroglycerin patch. While you were in the hospital, you were admitted for a significantly low blood pressure which comes with being in septic shock. As a result, we held your blood pressure medications until your clinical status improved and your blood pressure brianna. We slowly restarted your medications and at time of discharge you were restarted on your home verapamil and lisinopril; we held your furosemide and your nitroglycerin. Upon discharge, please check your blood pressure at home. You may continue taking verapamil 120 mg twice daily and lisinopril 5mg once daily. PLEASE CONTINUE TO HOLD YOUR FUROSEMIDE AND NITROGLYCERIN MEDICATIONS UNTIL YOU CAN FOLLOW-UP WITH YOUR PCP. Dr. Marie will decide if/when to resume the remainder of your medications.

## 2024-05-12 NOTE — DISCHARGE NOTE PROVIDER - NSDCCPTREATMENT_GEN_ALL_CORE_FT
PRINCIPAL PROCEDURE  Procedure: CT chest wo con  Findings and Treatment: PROCEDURE DATE:  05/13/2024    INTERPRETATION:  CLINICAL INFORMATION: 74-year-old female with   right-sided pleural effusion  TECHNIQUE: A volumetric CT acquisition of the chest was obtained from the   thoracic inlet to the upper abdomen, without administration of   intravenous contrast. This CT scan was performed with one or more of the   following dose optimization: iterative reconstruction, automatic exposure   control, and/or manual adjustment of mAs and kVp according to the   patient's size. 3D MIP and volume-rendered images were created at the   scanner.  COMPARISON: The study was correlated with chest radio  < end of copied text >  graph dated 5/12/2024  FINDINGS:  Lines and Tubes: None  Mediastinum: The thyroid and thoracic inlet are normal. There is no   enlarged axiliary, mediastinal, or hilar lymph nodes. The heart size is   within normal limits. There is no pericardial effusion. The aorta is   normal in course and caliber, with scattered calcified atheromas. There   are significant atherosclerotic calcification of AP coronary vessels.   Patient status post median sternotomy and CABG.  There is a small hiatal hernia.  Lung/pleura: The central tracheobronchial tree is patent. There is no   focal consolidation. There are small bibasilar pleural effusions with   atelectasis of posterior aspect of lower lobes. There is no suspicious   pulmonary nodule/mass. There is no pneumothorax.  Abdomen:There is a gastric diverticulum arising from greater curvature of   the stomach. Limited evaluation of liver, spleen, pancreas, adrenal   glands, and upper pole of kidneys is unremarkable.  Bone and Soft Tissue: There is no evidence of osteosclerotic or   osteolytic lesions. There are multilevel degenerative changes of thoracic   spine , with disk space narrowing and osteophytosis. The soft tissue is   grossly normal.  IMPRESSION:  Small bibasilar pleural effusions with atelectasis of posterior aspect of   lower lobes  --- End of Report ---< from: CT Chest No Cont (05.13.24 @ 16:14) >        SECONDARY PROCEDURE  Procedure: XR chest, 1 view  Findings and Treatment:   < end of copied text >  PROCEDURE DATE:  05/12/2024    INTERPRETATION:  TECHNIQUE: Single portable view of the chest.  COMPARISON:  5/9/2024  CLINICAL HISTORY: Cough  FINDINGS:  Single frontal view of the chest demonstrates small right-sided   effusion/atelectasis. The cardiomediastinal silhouette is enlarged.   Mediastinal sternotomy wires. No acute osseous abnormalities.  IMPRESSION: Small right-sided effusion/atelectasis. Consider lateral view   or chest CT.  --- End of Report ---< from: Xray Chest 1 View- PORTABLE-Urgent (Xray Chest 1 View- PORTABLE-Urgent .) (05.12.24 @ 14:33) >      Procedure: CT head wo con  Findings and Treatment:   < end of copied text >  PROCEDURE DATE:  05/09/2024    INTERPRETATION:  PROCEDURE: CT head without intravenous contrast  CLINICAL INDICATION: Sepsis and fall.  TECHNIQUE:  Multiple axial images were obtained and viewed at 5 mm   intervals from the skull base to the vertex. The images were reviewed in   brain and bone windows. Sagittal and coronal reformations are provided.  COMPARISON EXAMINATION: None.  FINDINGS:  VENTRICLES AND SULCI: The ventricles, cisternal spaces, and sulci are   appropriate for patient's age.  INTRA-AXIAL: No intracranial mass, acute hemorrhage, or midline shift is   present. No acute transcortical infarction. Decreased attenuation of the   deep white matter suggesting small vessel changes.  EXTRA-AXIAL: No extra-axial fluid collection is present.  VISUALIZED SINUSES: No air-fluid levels are identified.  VISUALIZED MASTOIDS: Well aerated.  CALVARIUM: No fracture.  IMPRESSION:  No acute intracranial hemorrhage or calvarial fracture.  Small vessel and atrophic changes.  --- End of Report ---  < from: CT Head No Cont (05.09.24 @ 22:48) >      Procedure: XR chest, 1 view  Findings and Treatment:   < end of copied text >  INTERPRETATION:  Clinical history/reason for exam: Sepsis.  Frontal chest low lung volumes.  COMPARISON: March 1, 2017.  Findings/  impression: Cardiomegaly/cardiac magnification, status post median   sternotomy, CABG. Lungs and mediastinum are unremarkable. Stable bony   structures.< from: Xray Chest 1 View-PORTABLE IMMEDIATE (05.09.24 @ 22:14) >

## 2024-05-12 NOTE — PROGRESS NOTE ADULT - PROBLEM SELECTOR PLAN 5
Patient with b/l consolidations on bedside POCUS in the MICU. Also with poor inspiratory effort, mainly taking shallow breaths, no wheezing, rales, or ronchi. CXR on admission showing low frontal lung volumes.     - chest PT  - incentive spirometry  - OOBTC Patient with b/l consolidations on bedside POCUS in the MICU. Also with poor inspiratory effort, mainly taking shallow breaths, no wheezing, rales, or ronchi. CXR on admission showing low frontal lung volumes.     - f/u repeat CXR today   - chest PT  - incentive spirometry  - OOBTC

## 2024-05-12 NOTE — PROGRESS NOTE ADULT - PROBLEM SELECTOR PLAN 1
#Sepsis 2/2 E. coli bacteremia   Patient initially presenting with septic vs. hypovolemic shock, meeting 2/4 SIRS with T 101.5 and leukocytosis WBC 19 with end organ damage- JUAN CARLOS and AMS, lactate 1.4. s/p 5L of fluid resuscitation on admission to the MICU, also requiring pressors. Causes of shock attributed to either hypovolemia 2/2 poor PO intake vs septic shock 2/2 likely to UTI, UA positive on admission, vs less likely GI source patient with n/v without abdominal tenderness or diarrhea, vs less likely of pulmonary etiology given CXR clear, patient without SOB or cough. Patient weaned off levophed in the MICU  BCx drawn on admission growing gram negative rods, E. coli    - s/p vanc 1g, zosyn 3.375g in the ED  - patient received zosyn 3.375g q8 in the MICU, was given one dose of meropenem 1g x1 on 5/10 after spiking Tmax of 103. ID approval recommended deescalating to zosyn for now given overall downtrend in fever curve, recommending escalation to meropenem only if new Tmax >103. Patient deescalated back to zosyn but 4.5g q8hrs due to gram negative bacteremia (initial concern for pseudomonas).   - c/w zosyn 3.375g q8, Cx now growing E. coli   - WBC downtrending 19-->12  - f/u surveillance Cx   - f/u culture sensitivities   - UCx negative #Sepsis 2/2 E. coli bacteremia   Patient initially presenting with septic vs. hypovolemic shock, meeting 2/4 SIRS with T 101.5 and leukocytosis WBC 19 with end organ damage- JUAN CARLOS and AMS, lactate 1.4. s/p 5L of fluid resuscitation on admission to the MICU, also requiring pressors. Causes of shock attributed to either hypovolemia 2/2 poor PO intake vs septic shock 2/2 likely to UTI, UA positive on admission, vs less likely GI source patient with n/v without abdominal tenderness or diarrhea, vs less likely of pulmonary etiology given CXR clear, patient without SOB or cough. Patient weaned off levophed in the MICU. BCx drawn on admission growing gram negative rods, E. coli. Surveillance cultures drawn 05/11 @ 6pm. S/p  vanc 1g, zosyn 3.375g in the ED. Received zosyn 3.375g q8 in the MICU, was given one dose of meropenem 1g x1 on 5/10 after spiking Tmax of 103. ID approval recommended deescalating to zosyn for now given overall downtrend in fever curve, recommending escalation to meropenem only if new Tmax >103. Patient deescalated back to zosyn 3.375g q8h after speciation revealed E. coli.   - Sensitivities today showing E. coli sensitive to CTX. Switch to IV CTX 2g x7 days- discussed with clinical pharmacist, too soon to consider PO regimen (despite sensitivities to ciprofloxacin, bactrim). Can revisit tomorrow   - WBC downtrending 19-->12 --> 9  - f/u surveillance Cx   - f/u culture sensitivities   - UCx negative  - Obtain repeat CXR to evaluate persistent cough

## 2024-05-12 NOTE — DISCHARGE NOTE PROVIDER - CARE PROVIDER_API CALL
Josue Marie  Internal Medicine  [address] [place] [id number]  Phone: [phone]  Fax: [phone]  Follow Up Time: 2 weeks   Josue Marie  Internal Medicine  [address] [place] [id number]  Phone: [phone]  Fax: [phone]  Established Patient  Scheduled Appointment: 05/20/2024 01:00 PM

## 2024-05-12 NOTE — DISCHARGE NOTE PROVIDER - HOSPITAL COURSE
#Discharge:    Patient is a 73 y/o F with HTN, CAD s/p CABG, pre-DM, diverticulitis with colovesical fistula s/p sigmoidectomy and bladder tumor resection in 2015, presenting with one week of nausea, vomiting, decreased PO intake, and fever found to have septic shock 2/2 E. coli bacteremia.    Hospital course:     Patient is a 74 year old F with PMHx of HTN, pre-DM, CAD s/p CABG, diverticulitis with colovesicular fistula s/p sigmoidectomy and bladder tumor resection in 2015, pshx L hip replacement 2017, R knee replacement 2016, presenting with one week hx of nausea, vomiting, decreased PO intake, and fever (Tmax 103), admitted to MICU for hypervolemic vs septic shock, hypokalemia, and JUAN CARLOS. Started on zosyn and minimal pressors but weaned off the next day. Patient s/p 1g meropenem, however ID approval recommended deescalating to zosyn. Started on zosyn pseudomonal dosing iso BCx positive for gram negative rods, but eventually decreased back to zosyn 3.375g q8hrs given speciation E. coli bacteremia. Legionella negative. Patient also noted to have b/l consolidations on bedside POCUS. Patient stable off pressors, tolerating OOBTC, stepped down to RMF.   Based on culture sensitivities, patient was deescalated to CTX 2g qd and will be discharged on cefpodoxime pending clinical improvement.     #Sepsis due to Escherichia coli.   Patient initially presenting with septic vs. hypovolemic shock, meeting 2/4 SIRS with T 101.5 and leukocytosis WBC 19 with end organ damage- JUAN CARLOS and AMS, lactate 1.4. s/p 5L of fluid resuscitation on admission to the MICU, also requiring pressors. Causes of shock attributed to either hypovolemia 2/2 poor PO intake vs septic shock 2/2 likely to UTI, UA positive on admission, vs less likely GI source patient with n/v without abdominal tenderness or diarrhea, vs less likely of pulmonary etiology given CXR clear, patient without SOB or cough. Patient weaned off levophed in the MICU. BCx drawn on admission growing gram negative rods, E. coli. Surveillance cultures drawn 05/11 @ 6pm. S/p  vanc 1g, zosyn 3.375g in the ED. Received zosyn 3.375g q8 in the MICU, was given one dose of meropenem 1g x1 on 5/10 after spiking Tmax of 103. ID approval recommended deescalating to zosyn for now given overall downtrend in fever curve, recommending escalation to meropenem only if new Tmax >103. Patient deescalated back to zosyn 3.375g q8h after speciation revealed E. coli. Sensitive to CTX, switched from Zosyn.   - Continue with IV CTX 2g x7 days   - f/u surveillance Cx   - f/u culture sensitivities   - UCx negative  - repeat CXR with small right sided pleural effusion, obtain CT chest to evaluate further.    #JUAN CARLOS (acute kidney injury). RESOLVED  Patient presenting with oliguric prerenal JUAN CARLOS iso nausea/vomiting and poor PO intake. Cr on admission 2.22, BUN 34   (Baseline Cr 0.93 in January 2024)  - Cr 1.17 on 5/11  - Monitor sCr and avoid nephrotoxins.    #Nausea and vomiting. RESOLVED  Patient presented with 1 week hx of nausea and vomiting, with poor PO intake. Unclear etiology, possibly gastroenteritis, though no diarrhea. Patient on famotidine 20mg qd at home. QTc on admission 530  - c/w pantoprazole 40mg q24  - c w Tigan 200 q6 PRN nausea/vomiting  - monitor QTc.    #Prolonged QT interval.   Patient with prolonged QTc on admission 530  - avoid QT prolonging medications  - serial EKG's.    #Lung consolidation.   Patient with b/l consolidations on bedside POCUS in the MICU. Also with poor inspiratory effort, mainly taking shallow breaths, no wheezing, rales, or ronchi. CXR on admission showing low frontal lung volumes. Repeat CXR 05/12 showing small right-sided pleural effusion.   - f/u CT chest   - chest PT  - incentive spirometry  - OOBTC.    #HTN (hypertension).   Patient on lisinopril 5mg qd, verapamil 120mg BID, furosemide 20mg qd, and nitroglycerine patch 0.4 at home. Anti-HTN meds held i/s/o sepsis vs. hypovolemic shock requiring pressors. However, today with /80.   - restart home verapamil 120mg BID and lisinopril 5mg QD with hold parameters   - monitor BP  - can resume anti-HTN meds gradually.    #CAD (coronary artery disease).   CAD s/p CABG   Patient on on ASA and plavix   - c/w ASA and Plavix 75mg qd.     Patient was discharged to home.     New medications:   Changes to old medications:  Medications that were stopped:    Items to follow up as outpatient: Please follow up with Dr. Marie    Physical exam at the time of discharge:       #Discharge:    Patient is a 75 y/o F with HTN, CAD s/p CABG, pre-DM, diverticulitis with colovesical fistula s/p sigmoidectomy and bladder tumor resection in 2015, presenting with one week of nausea, vomiting, decreased PO intake, and fever found to have septic shock 2/2 E. coli bacteremia.    Hospital course:     Patient is a 74 year old F with PMHx of HTN, pre-DM, CAD s/p CABG, diverticulitis with colovesicular fistula s/p sigmoidectomy and bladder tumor resection in 2015, pshx L hip replacement 2017, R knee replacement 2016, presenting with one week hx of nausea, vomiting, decreased PO intake, and fever (Tmax 103), admitted to MICU for hypervolemic vs septic shock, hypokalemia, and JUAN CARLOS. Started on zosyn and minimal pressors but weaned off the next day. Patient s/p 1g meropenem, however ID approval recommended deescalating to zosyn. Started on zosyn pseudomonal dosing iso BCx positive for gram negative rods, but eventually decreased back to zosyn 3.375g q8hrs given speciation E. coli bacteremia. Legionella negative. Patient also noted to have b/l consolidations on bedside POCUS. Patient stable off pressors, tolerating OOBTC, stepped down to RMF.   Based on culture sensitivities, patient was deescalated to CTX 2g qd and with clinical improvement now stable to be discharged on cefpodoxime.     #Sepsis due to Escherichia coli  Patient initially presenting with septic vs. hypovolemic shock, meeting 2/4 SIRS with T 101.5 and leukocytosis WBC 19 with end organ damage- JUAN CARLOS and AMS, lactate 1.4. s/p 5L of fluid resuscitation on admission to the MICU, also requiring pressors. Causes of shock attributed to either hypovolemia 2/2 poor PO intake vs septic shock 2/2 likely to UTI, UA positive on admission, vs less likely GI source patient with n/v without abdominal tenderness or diarrhea, vs less likely of pulmonary etiology given CXR clear, patient without SOB or cough. Patient weaned off levophed in the MICU. BCx drawn on admission growing gram negative rods, E. coli. Surveillance cultures drawn 05/11 @ 6pm. S/p  vanc 1g, zosyn 3.375g in the ED. Received zosyn 3.375g q8 in the MICU, was given one dose of meropenem 1g x1 on 5/10 after spiking Tmax of 103. ID approval recommended deescalating to zosyn for now given overall downtrend in fever curve, recommending escalation to meropenem only if new Tmax >103. Patient deescalated back to zosyn 3.375g q8h after speciation revealed E. coli. Sensitivities showing susceptibility to ceftriaxone so patient switched to IV CTX 2g x2 days.   - Continue with cefpodoxime 200mg twice daily for an additional three days through 05/16   - f/u surveillance Cx   - f/u culture sensitivities   - UCx negative    #HTN (hypertension)   Patient on lisinopril 5mg qd, verapamil 120mg BID, furosemide 20mg qd, and nitroglycerine patch 0.4 at home. Anti-HTN meds held i/s/o sepsis vs. hypovolemic shock requiring pressors. However, with improvement in sepsis and rise of BP to 156/80 so some of anti-hypertensive regimen restarted. Patient restarted on verapamil and lisinopril but held lasix and nitroglycerine patch.   - continue home verapamil 120mg BID and lisinopril 5mg QD  - hold furosemide 20mg qd and nitroglycerine patch until outpatient follow-up with PCP     #Cough  Patient with persistent cough for 2 weeks. Evaluated with chest x-ray which revealed small right sided effusion. Obtained CT chest which revealed bilateral small pleural effusions, no consolidation. Patient with improvement using DuoNebs and satting well on room air at time of discharge.   - No further intervention     #JUAN CARLOS (acute kidney injury). RESOLVED  Patient presenting with oliguric prerenal JUAN CARLOS iso nausea/vomiting and poor PO intake. Cr on admission 2.22, BUN 34   (Baseline Cr 0.93 in January 2024)  - Cr 1.17 on 5/11  - Monitor sCr and avoid nephrotoxins.    #Nausea and vomiting. RESOLVED  Patient presented with 1 week hx of nausea and vomiting, with poor PO intake. Unclear etiology, possibly gastroenteritis, though no diarrhea. Patient on famotidine 20mg qd at home. QTc on admission 530. Treated with Tigan 200mg q6h.     #Prolonged QT interval.   Patient with prolonged QTc on admission 530  - avoid QT prolonging medications    #CAD (coronary artery disease).   CAD s/p CABG   Patient on on ASA and plavix   - c/w ASA and Plavix 75mg qd.     Patient was discharged to home.     New medications: cefpodoxime 200mg BID x 3 days  Changes to old medications: n/a   Medications that were stopped: furosemide, nitroglycerin     Items to follow up as outpatient: Please follow up with Dr. Marie    Physical exam at the time of discharge:  General: NAD, sitting up in a chair, reports tolerating PO  Head: NC/AT; MMM   Respiratory: CTAB  Cardiovascular: Regular rhythm/rate  Gastrointestinal: Soft; NTND  Genitourinary: no suprapubic tenderness   Extremities: WWP; no edema/cyanosis in the UE or LE b/l   Neurological: A&Ox3, CNII-XII grossly intact; no obvious focal deficits     #Discharge:    Patient is a 73 y/o F with HTN, CAD s/p CABG, pre-DM, diverticulitis with colovesical fistula s/p sigmoidectomy and bladder tumor resection in 2015, presenting with one week of nausea, vomiting, decreased PO intake, and fever found to have septic shock 2/2 E. coli bacteremia.    Hospital course:     Patient is a 74 year old F with PMHx of HTN, pre-DM, CAD s/p CABG, diverticulitis with colovesicular fistula s/p sigmoidectomy and bladder tumor resection in 2015, pshx L hip replacement 2017, R knee replacement 2016, presenting with one week hx of nausea, vomiting, decreased PO intake, and fever (Tmax 103), admitted to MICU for hypervolemic vs septic shock, hypokalemia, and JUAN CARLOS. Started on zosyn and minimal pressors but weaned off the next day. Patient s/p 1g meropenem, however ID approval recommended deescalating to zosyn. Started on zosyn pseudomonal dosing iso BCx positive for gram negative rods, but eventually decreased back to zosyn 3.375g q8hrs given speciation E. coli bacteremia. Legionella negative. Patient also noted to have b/l consolidations on bedside POCUS. Patient stable off pressors, tolerating OOBTC, stepped down to RMF.   Based on culture sensitivities, patient was deescalated to CTX 2g qd and with clinical improvement now stable to be discharged on cefpodoxime.     #Sepsis due to Escherichia coli  Patient initially presenting with septic vs. hypovolemic shock, meeting 2/4 SIRS with T 101.5 and leukocytosis WBC 19 with end organ damage- JUAN CARLOS and AMS, lactate 1.4. s/p 5L of fluid resuscitation on admission to the MICU, also requiring pressors. Causes of shock attributed to either hypovolemia 2/2 poor PO intake vs septic shock 2/2 likely to UTI, UA positive on admission, vs less likely GI source patient with n/v without abdominal tenderness or diarrhea, vs less likely of pulmonary etiology given CXR clear, patient without SOB or cough. Patient weaned off levophed in the MICU. BCx drawn on admission growing gram negative rods, E. coli. Surveillance cultures drawn 05/11 @ 6pm. S/p  vanc 1g, zosyn 3.375g in the ED. Received zosyn 3.375g q8 in the MICU, was given one dose of meropenem 1g x1 on 5/10 after spiking Tmax of 103. ID approval recommended deescalating to zosyn for now given overall downtrend in fever curve, recommending escalation to meropenem only if new Tmax >103. Patient deescalated back to zosyn 3.375g q8h after speciation revealed E. coli. Sensitivities showing susceptibility to ceftriaxone so patient switched to IV CTX 2g x2 days. Stable for discharge and transition to cefpodoxime. Surveillance cultures with NGTD > 48 hours.   - Continue with cefpodoxime 200mg twice daily for an additional three days through 05/16     #HTN (hypertension)   Patient on lisinopril 5mg qd, verapamil 120mg BID, furosemide 20mg qd, and nitroglycerine patch 0.4 at home. Anti-HTN meds held i/s/o sepsis vs. hypovolemic shock requiring pressors. However, with improvement in sepsis and rise of BP to 156/80 so some of anti-hypertensive regimen restarted. Patient restarted on verapamil and lisinopril but held lasix and nitroglycerine patch.   - continue home verapamil 120mg BID and lisinopril 5mg QD  - hold furosemide 20mg qd and nitroglycerine patch until outpatient follow-up with PCP     #Cough  Patient with persistent cough for 2 weeks. Evaluated with chest x-ray which revealed small right sided effusion. Obtained CT chest which revealed bilateral small pleural effusions, no consolidation. Patient with improvement using DuoNebs and satting well on room air at time of discharge.   - No further intervention     #JUAN CARLOS (acute kidney injury). RESOLVED  Patient presenting with oliguric prerenal JUAN CARLOS iso nausea/vomiting and poor PO intake. Cr on admission 2.22, BUN 34   (Baseline Cr 0.93 in January 2024)  - Cr 1.17 on 5/11  - Monitor sCr and avoid nephrotoxins.    #Nausea and vomiting. RESOLVED  Patient presented with 1 week hx of nausea and vomiting, with poor PO intake. Unclear etiology, possibly gastroenteritis, though no diarrhea. Patient on famotidine 20mg qd at home. QTc on admission 530. Treated with Tigan 200mg q6h.     #Prolonged QT interval.   Patient with prolonged QTc on admission 530  - avoid QT prolonging medications    #CAD (coronary artery disease).   CAD s/p CABG   Patient on on ASA and plavix   - c/w ASA and Plavix 75mg qd.     Patient was discharged to home.     New medications: cefpodoxime 200mg BID x 3 days  Changes to old medications: n/a   Medications that were stopped: furosemide, nitroglycerin     Items to follow up as outpatient: Please follow up with Dr. Marie    Physical exam at the time of discharge:  General: NAD, sitting up in a chair, reports tolerating PO  Head: NC/AT; MMM   Respiratory: CTAB  Cardiovascular: Regular rhythm/rate  Gastrointestinal: Soft; NTND  Genitourinary: no suprapubic tenderness   Extremities: WWP; no edema/cyanosis in the UE or LE b/l   Neurological: A&Ox3, CNII-XII grossly intact; no obvious focal deficits

## 2024-05-12 NOTE — PROGRESS NOTE ADULT - SUBJECTIVE AND OBJECTIVE BOX
******INCOMPLETE******    OVERNIGHT EVENTS/INTERVAL HPI: DARLEEN    SUBJECTIVE:     OBJECTIVE:  Vital Signs Last 24 Hrs  T(C): 37.6 (11 May 2024 20:41), Max: 38.9 (11 May 2024 11:00)  T(F): 99.7 (11 May 2024 20:41), Max: 102 (11 May 2024 11:00)  HR: 83 (11 May 2024 20:41) (64 - 83)  BP: 127/74 (11 May 2024 20:41) (96/52 - 145/72)  BP(mean): 96 (11 May 2024 15:35) (69 - 96)  RR: 18 (11 May 2024 20:41) (14 - 27)  SpO2: 96% (11 May 2024 20:41) (92% - 100%)    Parameters below as of 11 May 2024 20:41  Patient On (Oxygen Delivery Method): room air      I&O's Detail    10 May 2024 07:01  -  11 May 2024 07:00  --------------------------------------------------------  IN:    IV PiggyBack: 50 mL    IV PiggyBack: 50 mL    IV PiggyBack: 316.6 mL    Lactated Ringers: 450 mL    Norepinephrine: 53.3 mL  Total IN: 919.9 mL    OUT:    Voided (mL): 2200 mL  Total OUT: 2200 mL    Total NET: -1280.1 mL      11 May 2024 07:01  -  12 May 2024 05:33  --------------------------------------------------------  IN:    IV PiggyBack: 249.9 mL  Total IN: 249.9 mL    OUT:    Voided (mL): 800 mL  Total OUT: 800 mL    Total NET: -550.1 mL    Physical Exam:  General: NAD, sitting up in a chair, reports tolerating PO  Head: NC/AT; MMM   Respiratory: CTAB, however patient struggling to take deep breaths, mainly taking shallow breaths b/l   Cardiovascular: Regular rhythm/rate  Gastrointestinal: Soft; NTND  Genitourinary: no suprapubic tenderness   Extremities: WWP; no edema/cyanosis in the UE or LE b/l   Neurological: A&Ox3, CNII-XII grossly intact; no obvious focal deficits    Medications:  MEDICATIONS  (STANDING):  aspirin  chewable 81 milliGRAM(s) Oral daily  atorvastatin 10 milliGRAM(s) Oral at bedtime  clopidogrel Tablet 75 milliGRAM(s) Oral daily  heparin   Injectable 5000 Unit(s) SubCutaneous every 8 hours  pantoprazole  Injectable 40 milliGRAM(s) IV Push every 24 hours  piperacillin/tazobactam IVPB.. 3.375 Gram(s) IV Intermittent every 8 hours    MEDICATIONS  (PRN):  acetaminophen     Tablet .. 650 milliGRAM(s) Oral every 6 hours PRN Temp greater or equal to 38C (100.4F), Mild Pain (1 - 3)  trimethobenzamide Injectable 200 milliGRAM(s) IntraMuscular every 6 hours PRN Nausea and/or Vomiting      Labs:              OVERNIGHT EVENTS/INTERVAL HPI: SILVAJESUSON    SUBJECTIVE: Patient seen and examined at bedside. Endorses a 2 week history of cough that was initially productive but is now dry. She also noted that her blood pressure was elevated this morning as she has been off her home blood pressure medications due to sepsis. Denies any headache, chest pain, abdominal pain. Is having bowel movements and denies constipation/diarrhea/urinary symptoms. She would like another incentive spirometer as hers got lost in transit from MICU to here.     OBJECTIVE:  Vital Signs Last 24 Hrs  T(C): 37.6 (11 May 2024 20:41), Max: 38.9 (11 May 2024 11:00)  T(F): 99.7 (11 May 2024 20:41), Max: 102 (11 May 2024 11:00)  HR: 83 (11 May 2024 20:41) (64 - 83)  BP: 127/74 (11 May 2024 20:41) (96/52 - 145/72)  BP(mean): 96 (11 May 2024 15:35) (69 - 96)  RR: 18 (11 May 2024 20:41) (14 - 27)  SpO2: 96% (11 May 2024 20:41) (92% - 100%)    Parameters below as of 11 May 2024 20:41  Patient On (Oxygen Delivery Method): room air      I&O's Detail    10 May 2024 07:01  -  11 May 2024 07:00  --------------------------------------------------------  IN:    IV PiggyBack: 50 mL    IV PiggyBack: 50 mL    IV PiggyBack: 316.6 mL    Lactated Ringers: 450 mL    Norepinephrine: 53.3 mL  Total IN: 919.9 mL    OUT:    Voided (mL): 2200 mL  Total OUT: 2200 mL    Total NET: -1280.1 mL      11 May 2024 07:01  -  12 May 2024 05:33  --------------------------------------------------------  IN:    IV PiggyBack: 249.9 mL  Total IN: 249.9 mL    OUT:    Voided (mL): 800 mL  Total OUT: 800 mL    Total NET: -550.1 mL    Physical Exam:  General: NAD, sitting up in a chair, reports tolerating PO  Head: NC/AT; MMM   Respiratory: CTAB, however patient struggling to take deep breaths, mainly taking shallow breaths b/l   Cardiovascular: Regular rhythm/rate  Gastrointestinal: Soft; NTND  Genitourinary: no suprapubic tenderness   Extremities: WWP; no edema/cyanosis in the UE or LE b/l   Neurological: A&Ox3, CNII-XII grossly intact; no obvious focal deficits    Medications:  MEDICATIONS  (STANDING):  aspirin  chewable 81 milliGRAM(s) Oral daily  atorvastatin 10 milliGRAM(s) Oral at bedtime  clopidogrel Tablet 75 milliGRAM(s) Oral daily  heparin   Injectable 5000 Unit(s) SubCutaneous every 8 hours  pantoprazole  Injectable 40 milliGRAM(s) IV Push every 24 hours  piperacillin/tazobactam IVPB.. 3.375 Gram(s) IV Intermittent every 8 hours    MEDICATIONS  (PRN):  acetaminophen     Tablet .. 650 milliGRAM(s) Oral every 6 hours PRN Temp greater or equal to 38C (100.4F), Mild Pain (1 - 3)  trimethobenzamide Injectable 200 milliGRAM(s) IntraMuscular every 6 hours PRN Nausea and/or Vomiting      Labs:                          10.1   9.47  )-----------( 162      ( 12 May 2024 07:09 )             31.7     05-12    138  |  104  |  15  ----------------------------<  131<H>  3.8   |  22  |  1.10    Ca    8.2<L>      12 May 2024 07:09  Phos  2.7     05-12  Mg     2.0     05-12    TPro  5.8<L>  /  Alb  2.8<L>  /  TBili  0.4  /  DBili  x   /  AST  45<H>  /  ALT  34  /  AlkPhos  164<H>  05-12      Urinalysis Basic - ( 12 May 2024 07:09 )    Color: x / Appearance: x / SG: x / pH: x  Gluc: 131 mg/dL / Ketone: x  / Bili: x / Urobili: x   Blood: x / Protein: x / Nitrite: x   Leuk Esterase: x / RBC: x / WBC x   Sq Epi: x / Non Sq Epi: x / Bacteria: x                RADIOLOGY, EKG & ADDITIONAL TESTS: Reviewed.

## 2024-05-13 ENCOUNTER — TRANSCRIPTION ENCOUNTER (OUTPATIENT)
Age: 74
End: 2024-05-13

## 2024-05-13 LAB
A PHAGOCYTOPH IGG TITR SER IF: SIGNIFICANT CHANGE UP
ANION GAP SERPL CALC-SCNC: 10 MMOL/L — SIGNIFICANT CHANGE UP (ref 5–17)
B BURGDOR AB SER QL IA: 0.13 IV — SIGNIFICANT CHANGE UP
B MICROTI IGG TITR SER: SIGNIFICANT CHANGE UP
BUN SERPL-MCNC: 14 MG/DL — SIGNIFICANT CHANGE UP (ref 7–23)
CALCIUM SERPL-MCNC: 8.5 MG/DL — SIGNIFICANT CHANGE UP (ref 8.4–10.5)
CHLORIDE SERPL-SCNC: 106 MMOL/L — SIGNIFICANT CHANGE UP (ref 96–108)
CO2 SERPL-SCNC: 24 MMOL/L — SIGNIFICANT CHANGE UP (ref 22–31)
CREAT SERPL-MCNC: 0.93 MG/DL — SIGNIFICANT CHANGE UP (ref 0.5–1.3)
E CHAFFEENSIS IGG TITR SER IF: SIGNIFICANT CHANGE UP
EGFR: 65 ML/MIN/1.73M2 — SIGNIFICANT CHANGE UP
GLUCOSE SERPL-MCNC: 120 MG/DL — HIGH (ref 70–99)
HCT VFR BLD CALC: 30.6 % — LOW (ref 34.5–45)
HGB BLD-MCNC: 9.7 G/DL — LOW (ref 11.5–15.5)
MAGNESIUM SERPL-MCNC: 1.7 MG/DL — SIGNIFICANT CHANGE UP (ref 1.6–2.6)
MCHC RBC-ENTMCNC: 29 PG — SIGNIFICANT CHANGE UP (ref 27–34)
MCHC RBC-ENTMCNC: 31.7 GM/DL — LOW (ref 32–36)
MCV RBC AUTO: 91.3 FL — SIGNIFICANT CHANGE UP (ref 80–100)
NRBC # BLD: 0 /100 WBCS — SIGNIFICANT CHANGE UP (ref 0–0)
PHOSPHATE SERPL-MCNC: 2.9 MG/DL — SIGNIFICANT CHANGE UP (ref 2.5–4.5)
PLATELET # BLD AUTO: 173 K/UL — SIGNIFICANT CHANGE UP (ref 150–400)
POTASSIUM SERPL-MCNC: 4 MMOL/L — SIGNIFICANT CHANGE UP (ref 3.5–5.3)
POTASSIUM SERPL-SCNC: 4 MMOL/L — SIGNIFICANT CHANGE UP (ref 3.5–5.3)
RBC # BLD: 3.35 M/UL — LOW (ref 3.8–5.2)
RBC # FLD: 13.7 % — SIGNIFICANT CHANGE UP (ref 10.3–14.5)
SODIUM SERPL-SCNC: 140 MMOL/L — SIGNIFICANT CHANGE UP (ref 135–145)
WBC # BLD: 7.95 K/UL — SIGNIFICANT CHANGE UP (ref 3.8–10.5)
WBC # FLD AUTO: 7.95 K/UL — SIGNIFICANT CHANGE UP (ref 3.8–10.5)

## 2024-05-13 PROCEDURE — 71250 CT THORAX DX C-: CPT | Mod: 26

## 2024-05-13 RX ORDER — CEFPODOXIME PROXETIL 100 MG
200 TABLET ORAL EVERY 12 HOURS
Refills: 0 | Status: DISCONTINUED | OUTPATIENT
Start: 2024-05-14 | End: 2024-05-14

## 2024-05-13 RX ORDER — MAGNESIUM SULFATE 500 MG/ML
1 VIAL (ML) INJECTION ONCE
Refills: 0 | Status: COMPLETED | OUTPATIENT
Start: 2024-05-13 | End: 2024-05-13

## 2024-05-13 RX ORDER — CEFPODOXIME PROXETIL 100 MG
1 TABLET ORAL
Qty: 6 | Refills: 0
Start: 2024-05-13 | End: 2024-05-15

## 2024-05-13 RX ADMIN — HEPARIN SODIUM 5000 UNIT(S): 5000 INJECTION INTRAVENOUS; SUBCUTANEOUS at 21:29

## 2024-05-13 RX ADMIN — CEFTRIAXONE 100 MILLIGRAM(S): 500 INJECTION, POWDER, FOR SOLUTION INTRAMUSCULAR; INTRAVENOUS at 13:20

## 2024-05-13 RX ADMIN — Medication 120 MILLIGRAM(S): at 21:29

## 2024-05-13 RX ADMIN — CLOPIDOGREL BISULFATE 75 MILLIGRAM(S): 75 TABLET, FILM COATED ORAL at 11:39

## 2024-05-13 RX ADMIN — HEPARIN SODIUM 5000 UNIT(S): 5000 INJECTION INTRAVENOUS; SUBCUTANEOUS at 13:20

## 2024-05-13 RX ADMIN — HEPARIN SODIUM 5000 UNIT(S): 5000 INJECTION INTRAVENOUS; SUBCUTANEOUS at 06:31

## 2024-05-13 RX ADMIN — Medication 100 GRAM(S): at 10:05

## 2024-05-13 RX ADMIN — Medication 3 MILLILITER(S): at 01:44

## 2024-05-13 RX ADMIN — Medication 81 MILLIGRAM(S): at 11:39

## 2024-05-13 RX ADMIN — ATORVASTATIN CALCIUM 10 MILLIGRAM(S): 80 TABLET, FILM COATED ORAL at 21:29

## 2024-05-13 RX ADMIN — PANTOPRAZOLE SODIUM 40 MILLIGRAM(S): 20 TABLET, DELAYED RELEASE ORAL at 09:42

## 2024-05-13 RX ADMIN — Medication 120 MILLIGRAM(S): at 09:42

## 2024-05-13 NOTE — PROGRESS NOTE ADULT - PROBLEM SELECTOR PLAN 8
F: s/p 5L bolus in the ED followed by 3.6 L maintenance fluids since admission  E: replete as needed  N: Regular diet  DVT ppx: Heparin subQ  GI ppx: pantoprazole     DISPO: [facility]
F: s/p 5L bolus in the ED followed by 3.6 L maintenance fluids since admission  E: replete as needed  N: Regular diet  DVT ppx: Heparin subQ  GI ppx: pantoprazole     DISPO: [facility]
F: s/p 5L bolus in the ED followed by 3.6 L maintenance fluids since admission  E: replete as needed  N: Regular diet  DVT ppx: Heparin subQ  GI ppx: pantoprazole     DISPO: [facility]

## 2024-05-13 NOTE — PROGRESS NOTE ADULT - PROBLEM SELECTOR PLAN 1
#Sepsis 2/2 E. coli bacteremia   Patient initially presenting with septic vs. hypovolemic shock, meeting 2/4 SIRS with T 101.5 and leukocytosis WBC 19 with end organ damage- JUAN CARLOS and AMS, lactate 1.4. s/p 5L of fluid resuscitation on admission to the MICU, also requiring pressors. Causes of shock attributed to either hypovolemia 2/2 poor PO intake vs septic shock 2/2 likely to UTI, UA positive on admission, vs less likely GI source patient with n/v without abdominal tenderness or diarrhea, vs less likely of pulmonary etiology given CXR clear, patient without SOB or cough. Patient weaned off levophed in the MICU. BCx drawn on admission growing gram negative rods, E. coli. Surveillance cultures drawn 05/11 @ 6pm. S/p  vanc 1g, zosyn 3.375g in the ED. Received zosyn 3.375g q8 in the MICU, was given one dose of meropenem 1g x1 on 5/10 after spiking Tmax of 103. ID approval recommended deescalating to zosyn for now given overall downtrend in fever curve, recommending escalation to meropenem only if new Tmax >103. Patient deescalated back to zosyn 3.375g q8h after speciation revealed E. coli.   - Sensitivities today showing E. coli sensitive to CTX. Switch to IV CTX 2g x7 days- discussed with clinical pharmacist, too soon to consider PO regimen (despite sensitivities to ciprofloxacin, bactrim). Can revisit tomorrow   - WBC downtrending 19-->12 --> 9  - f/u surveillance Cx   - f/u culture sensitivities   - UCx negative  - Obtain repeat CXR to evaluate persistent cough #Sepsis 2/2 E. coli bacteremia   Patient initially presenting with septic vs. hypovolemic shock, meeting 2/4 SIRS with T 101.5 and leukocytosis WBC 19 with end organ damage- JUAN CARLOS and AMS, lactate 1.4. s/p 5L of fluid resuscitation on admission to the MICU, also requiring pressors. Causes of shock attributed to either hypovolemia 2/2 poor PO intake vs septic shock 2/2 likely to UTI, UA positive on admission, vs less likely GI source patient with n/v without abdominal tenderness or diarrhea, vs less likely of pulmonary etiology given CXR clear, patient without SOB or cough. Patient weaned off levophed in the MICU. BCx drawn on admission growing gram negative rods, E. coli. Surveillance cultures drawn 05/11 @ 6pm. S/p  vanc 1g, zosyn 3.375g in the ED. Received zosyn 3.375g q8 in the MICU, was given one dose of meropenem 1g x1 on 5/10 after spiking Tmax of 103. ID approval recommended deescalating to zosyn for now given overall downtrend in fever curve, recommending escalation to meropenem only if new Tmax >103. Patient deescalated back to zosyn 3.375g q8h after speciation revealed E. coli. Sensitive to CTX, switched from Zosyn. WBC downtrending 19 -> 7K today  - Continue with IV CTX 2g x7 days   - f/u surveillance Cx   - f/u culture sensitivities   - UCx negative  - repeat CXR with small right sided pleural effusion, obtain CT chest to evaluate further

## 2024-05-13 NOTE — PROGRESS NOTE ADULT - PROBLEM SELECTOR PLAN 2
Patient presenting with oliguric prerenal JUAN CARLOS iso nausea/vomiting and poor PO intake. Cr on admission 2.22, BUN 34   (Baseline Cr 0.93 in January 2024)    - RESOLVED  - Cr 1.17 on 5/11  - Monitor sCr and avoid nephrotoxins

## 2024-05-13 NOTE — DISCHARGE NOTE NURSING/CASE MANAGEMENT/SOCIAL WORK - NSDCPEFALRISK_GEN_ALL_CORE
For information on Fall & Injury Prevention, visit: [email or web address] OR  [email or web address] OR  https://www.cdc.gov/steadi/patient.html

## 2024-05-13 NOTE — PROGRESS NOTE ADULT - PROBLEM SELECTOR PLAN 4
Patient with prolonged QTc on admission 530    - avoid QT prolonging medications  - serial EKG's

## 2024-05-13 NOTE — PHYSICAL THERAPY INITIAL EVALUATION ADULT - BED MOBILITY LIMITATIONS, REHAB EVAL
not assessed; patient received/returned seated in bedside chair; However, patient demos appropriate strength to perform all bed mobility independently.

## 2024-05-13 NOTE — PHYSICAL THERAPY INITIAL EVALUATION ADULT - ADDITIONAL COMMENTS
Active community ambulator who lives with her  in elevator access apartment, no ALLA. Independent with all ADLs prior and denies use of AD when ambulating.

## 2024-05-13 NOTE — PHYSICAL THERAPY INITIAL EVALUATION ADULT - GENERAL OBSERVATIONS, REHAB EVAL
PT IE completed. Patient received seated in bedside chair +heplock IV, +RA, NAD, willing to work with PT.

## 2024-05-13 NOTE — PROGRESS NOTE ADULT - PROBLEM SELECTOR PLAN 3
Patient presented with 1 week hx of nausea and vomiting, with poor PO intake. Unclear etiology, possibly gastroenteritis, though no diarrhea. Patient on famotidine 20mg qd at home. QTc on admission 530    - c/w pantoprazole 40mg q24  - c w Tigan 200 q6 PRN nausea/vomiting  - monitor QTc

## 2024-05-13 NOTE — PROGRESS NOTE ADULT - PROBLEM SELECTOR PROBLEM 2
JUAN CARLOS (acute kidney injury)

## 2024-05-13 NOTE — PHYSICAL THERAPY INITIAL EVALUATION ADULT - GAIT DEVIATIONS NOTED, PT EVAL
Demo fairly steady gait, no LOB observed, good navigation of hallway obstacles/decreased dao/increased time in double stance/decreased velocity of limb motion/decreased step length/decreased stride length/decreased weight-shifting ability

## 2024-05-13 NOTE — PROGRESS NOTE ADULT - SUBJECTIVE AND OBJECTIVE BOX
******INCOMPLETE******    OVERNIGHT EVENTS/INTERVAL HPI:    OBJECTIVE:  Vital Signs Last 24 Hrs  T(C): 37.3 (13 May 2024 06:23), Max: 37.3 (13 May 2024 06:23)  T(F): 99.2 (13 May 2024 06:23), Max: 99.2 (13 May 2024 06:23)  HR: 78 (13 May 2024 06:23) (57 - 80)  BP: 137/78 (13 May 2024 06:23) (95/57 - 144/72)  BP(mean): --  RR: 18 (13 May 2024 06:23) (17 - 18)  SpO2: 98% (13 May 2024 06:23) (95% - 98%)    Parameters below as of 13 May 2024 06:23  Patient On (Oxygen Delivery Method): room air      I&O's Detail    Physical Exam:  GENERAL: Awake, alert and interactive, no acute distress, appears comfortable  NEURO: A&Ox4, no focal deficits, 5/5 strength in all ext, reflexes 2+ throughout, CN 2-12 intact  HEENT: Normocephalic, atraumatic, no conjunctivitis or scleral icterus, oral mucosa moist, no oral lesions noted  NECK: Supple, no LAD, no JVD, thyroid not palpable  CARDIAC: Regular rate and rhythm, +S1/S2, no murmurs/rubs/gallops  PULM: Breathing comfortably on RA, clear to auscultation bilaterally, no wheezes/rales/rhonchi  ABDOMEN: Soft, nontender, nondistended, +bs, no hepatosplenomegaly, no rebound tenderness or fluid wave, no CVA tenderness  : Deferred  MSK: Range of motion grossly intact, no back tenderness  SKIN: Warm and dry, no rashes, lesions  VASC: Cap refil < 2 sec, 2+ peripheral pulses, no edema, no LE tenderness  Psych: Appropriate affect    Medications:  MEDICATIONS  (STANDING):  aspirin  chewable 81 milliGRAM(s) Oral daily  atorvastatin 10 milliGRAM(s) Oral at bedtime  cefTRIAXone   IVPB 2000 milliGRAM(s) IV Intermittent every 24 hours  clopidogrel Tablet 75 milliGRAM(s) Oral daily  heparin   Injectable 5000 Unit(s) SubCutaneous every 8 hours  lisinopril 5 milliGRAM(s) Oral daily  pantoprazole  Injectable 40 milliGRAM(s) IV Push every 24 hours  verapamil  milliGRAM(s) Oral <User Schedule>    MEDICATIONS  (PRN):  acetaminophen     Tablet .. 650 milliGRAM(s) Oral every 6 hours PRN Temp greater or equal to 38C (100.4F), Mild Pain (1 - 3)  albuterol/ipratropium for Nebulization 3 milliLiter(s) Nebulizer every 6 hours PRN Shortness of Breath and/or Wheezing  trimethobenzamide Injectable 200 milliGRAM(s) IntraMuscular every 6 hours PRN Nausea and/or Vomiting      Labs:                        10.1   9.47  )-----------( 162      ( 12 May 2024 07:09 )             31.7     05-12    138  |  104  |  15  ----------------------------<  131<H>  3.8   |  22  |  1.10    Ca    8.2<L>      12 May 2024 07:09  Phos  2.7     05-12  Mg     2.0     05-12    TPro  5.8<L>  /  Alb  2.8<L>  /  TBili  0.4  /  DBili  x   /  AST  45<H>  /  ALT  34  /  AlkPhos  164<H>  05-12        Urinalysis Basic - ( 12 May 2024 07:09 )    Color: x / Appearance: x / SG: x / pH: x  Gluc: 131 mg/dL / Ketone: x  / Bili: x / Urobili: x   Blood: x / Protein: x / Nitrite: x   Leuk Esterase: x / RBC: x / WBC x   Sq Epi: x / Non Sq Epi: x / Bacteria: x      SARS-CoV-2: NotDetec (09 May 2024 22:41)      Radiology: Reviewed OVERNIGHT EVENTS/INTERVAL HPI: 8pm BP improved to 126/66 but patient did not want verapamil. Called CT x1 but not performed yet.     SUBJECTIVE: Patient seen and examined at bedside. Feels like the DuoNebs have been helping her. Walked up and down hallway this morning without distress.     OBJECTIVE:  Vital Signs Last 24 Hrs  T(C): 37.3 (13 May 2024 06:23), Max: 37.3 (13 May 2024 06:23)  T(F): 99.2 (13 May 2024 06:23), Max: 99.2 (13 May 2024 06:23)  HR: 78 (13 May 2024 06:23) (57 - 80)  BP: 137/78 (13 May 2024 06:23) (95/57 - 144/72)  BP(mean): --  RR: 18 (13 May 2024 06:23) (17 - 18)  SpO2: 98% (13 May 2024 06:23) (95% - 98%)    Parameters below as of 13 May 2024 06:23  Patient On (Oxygen Delivery Method): room air    I&O's Detail    Physical Exam:  General: NAD, sitting up in a chair, reports tolerating PO  Head: NC/AT; MMM   Respiratory: CTAB, however patient struggling to take deep breaths, mainly taking shallow breaths b/l   Cardiovascular: Regular rhythm/rate  Gastrointestinal: Soft; NTND  Genitourinary: no suprapubic tenderness   Extremities: WWP; no edema/cyanosis in the UE or LE b/l   Neurological: A&Ox3, CNII-XII grossly intact; no obvious focal deficits    Medications:  MEDICATIONS  (STANDING):  aspirin  chewable 81 milliGRAM(s) Oral daily  atorvastatin 10 milliGRAM(s) Oral at bedtime  cefTRIAXone   IVPB 2000 milliGRAM(s) IV Intermittent every 24 hours  clopidogrel Tablet 75 milliGRAM(s) Oral daily  heparin   Injectable 5000 Unit(s) SubCutaneous every 8 hours  lisinopril 5 milliGRAM(s) Oral daily  pantoprazole  Injectable 40 milliGRAM(s) IV Push every 24 hours  verapamil  milliGRAM(s) Oral <User Schedule>    MEDICATIONS  (PRN):  acetaminophen     Tablet .. 650 milliGRAM(s) Oral every 6 hours PRN Temp greater or equal to 38C (100.4F), Mild Pain (1 - 3)  albuterol/ipratropium for Nebulization 3 milliLiter(s) Nebulizer every 6 hours PRN Shortness of Breath and/or Wheezing  trimethobenzamide Injectable 200 milliGRAM(s) IntraMuscular every 6 hours PRN Nausea and/or Vomiting      Labs:                        9.7    7.95  )-----------( 173      ( 13 May 2024 05:30 )             30.6     05-13    140  |  106  |  14  ----------------------------<  120<H>  4.0   |  24  |  0.93    Ca    8.5      13 May 2024 05:30  Phos  2.9     05-13  Mg     1.7     05-13    TPro  5.8<L>  /  Alb  2.8<L>  /  TBili  0.4  /  DBili  x   /  AST  45<H>  /  ALT  34  /  AlkPhos  164<H>  05-12      Urinalysis Basic - ( 13 May 2024 05:30 )    Color: x / Appearance: x / SG: x / pH: x  Gluc: 120 mg/dL / Ketone: x  / Bili: x / Urobili: x   Blood: x / Protein: x / Nitrite: x   Leuk Esterase: x / RBC: x / WBC x   Sq Epi: x / Non Sq Epi: x / Bacteria: x                RADIOLOGY, EKG & ADDITIONAL TESTS: Reviewed.

## 2024-05-13 NOTE — PROGRESS NOTE ADULT - PROBLEM SELECTOR PLAN 7
CAD s/p CABG   Patient on on ASA and plavix     - c/w ASA and Plavix 75mg qd  - f/u formal TTE (no heart failure history per pt) CAD s/p CABG   Patient on on ASA and plavix     - c/w ASA and Plavix 75mg qd

## 2024-05-13 NOTE — PROGRESS NOTE ADULT - PROBLEM SELECTOR PLAN 6
Patient on lisinopril 5mg qd, verapamil 120mg BID, furosemide 20mg qd, and nitroglycerine patch 0.4 at home. Anti-HTN meds held i/s/o sepsis vs. hypovolemic shock requiring pressors. However, today with /80.     - restart home verapamil 120mg BID and lisinopril 5mg QD   - monitor BP  - can resume anti-HTN meds gradually Patient on lisinopril 5mg qd, verapamil 120mg BID, furosemide 20mg qd, and nitroglycerine patch 0.4 at home. Anti-HTN meds held i/s/o sepsis vs. hypovolemic shock requiring pressors. However, today with /80.     - restart home verapamil 120mg BID and lisinopril 5mg QD with hold parameters   - monitor BP  - can resume anti-HTN meds gradually

## 2024-05-13 NOTE — PROGRESS NOTE ADULT - PROBLEM SELECTOR PLAN 5
Patient with b/l consolidations on bedside POCUS in the MICU. Also with poor inspiratory effort, mainly taking shallow breaths, no wheezing, rales, or ronchi. CXR on admission showing low frontal lung volumes.     - f/u repeat CXR today   - chest PT  - incentive spirometry  - OOBTC Patient with b/l consolidations on bedside POCUS in the MICU. Also with poor inspiratory effort, mainly taking shallow breaths, no wheezing, rales, or ronchi. CXR on admission showing low frontal lung volumes. Repeat CXR 05/12 showing small right-sided pleural effusion.     - obtain CT chest   - chest PT  - incentive spirometry  - OOBTC

## 2024-05-13 NOTE — DISCHARGE NOTE NURSING/CASE MANAGEMENT/SOCIAL WORK - PATIENT PORTAL LINK FT
You can access the FollowMyHealth Patient Portal offered by [facility] by registering at the following website: [email or web address]. By joining [facility]’s FollowMyHealth portal, you will also be able to view your health information using other applications (apps) compatible with our system.

## 2024-05-14 VITALS
HEART RATE: 57 BPM | OXYGEN SATURATION: 97 % | SYSTOLIC BLOOD PRESSURE: 107 MMHG | TEMPERATURE: 99 F | RESPIRATION RATE: 18 BRPM | DIASTOLIC BLOOD PRESSURE: 63 MMHG

## 2024-05-14 LAB
ANION GAP SERPL CALC-SCNC: 11 MMOL/L — SIGNIFICANT CHANGE UP (ref 5–17)
BUN SERPL-MCNC: 16 MG/DL — SIGNIFICANT CHANGE UP (ref 7–23)
CALCIUM SERPL-MCNC: 9 MG/DL — SIGNIFICANT CHANGE UP (ref 8.4–10.5)
CHLORIDE SERPL-SCNC: 108 MMOL/L — SIGNIFICANT CHANGE UP (ref 96–108)
CO2 SERPL-SCNC: 22 MMOL/L — SIGNIFICANT CHANGE UP (ref 22–31)
CREAT SERPL-MCNC: 0.96 MG/DL — SIGNIFICANT CHANGE UP (ref 0.5–1.3)
EGFR: 63 ML/MIN/1.73M2 — SIGNIFICANT CHANGE UP
GLUCOSE SERPL-MCNC: 119 MG/DL — HIGH (ref 70–99)
HCT VFR BLD CALC: 32.7 % — LOW (ref 34.5–45)
HGB BLD-MCNC: 9.9 G/DL — LOW (ref 11.5–15.5)
MAGNESIUM SERPL-MCNC: 1.8 MG/DL — SIGNIFICANT CHANGE UP (ref 1.6–2.6)
MCHC RBC-ENTMCNC: 28.8 PG — SIGNIFICANT CHANGE UP (ref 27–34)
MCHC RBC-ENTMCNC: 30.3 GM/DL — LOW (ref 32–36)
MCV RBC AUTO: 95.1 FL — SIGNIFICANT CHANGE UP (ref 80–100)
NRBC # BLD: 0 /100 WBCS — SIGNIFICANT CHANGE UP (ref 0–0)
PHOSPHATE SERPL-MCNC: 4.2 MG/DL — SIGNIFICANT CHANGE UP (ref 2.5–4.5)
PLATELET # BLD AUTO: 207 K/UL — SIGNIFICANT CHANGE UP (ref 150–400)
POTASSIUM SERPL-MCNC: 4.1 MMOL/L — SIGNIFICANT CHANGE UP (ref 3.5–5.3)
POTASSIUM SERPL-SCNC: 4.1 MMOL/L — SIGNIFICANT CHANGE UP (ref 3.5–5.3)
RBC # BLD: 3.44 M/UL — LOW (ref 3.8–5.2)
RBC # FLD: 13.8 % — SIGNIFICANT CHANGE UP (ref 10.3–14.5)
SODIUM SERPL-SCNC: 141 MMOL/L — SIGNIFICANT CHANGE UP (ref 135–145)
WBC # BLD: 7.6 K/UL — SIGNIFICANT CHANGE UP (ref 3.8–10.5)
WBC # FLD AUTO: 7.6 K/UL — SIGNIFICANT CHANGE UP (ref 3.8–10.5)

## 2024-05-14 PROCEDURE — 86901 BLOOD TYPING SEROLOGIC RH(D): CPT

## 2024-05-14 PROCEDURE — 80053 COMPREHEN METABOLIC PANEL: CPT

## 2024-05-14 PROCEDURE — 84443 ASSAY THYROID STIM HORMONE: CPT

## 2024-05-14 PROCEDURE — 83735 ASSAY OF MAGNESIUM: CPT

## 2024-05-14 PROCEDURE — 85025 COMPLETE CBC W/AUTO DIFF WBC: CPT

## 2024-05-14 PROCEDURE — 99285 EMERGENCY DEPT VISIT HI MDM: CPT

## 2024-05-14 PROCEDURE — 80307 DRUG TEST PRSMV CHEM ANLYZR: CPT

## 2024-05-14 PROCEDURE — 84300 ASSAY OF URINE SODIUM: CPT

## 2024-05-14 PROCEDURE — 87040 BLOOD CULTURE FOR BACTERIA: CPT

## 2024-05-14 PROCEDURE — 86753 PROTOZOA ANTIBODY NOS: CPT

## 2024-05-14 PROCEDURE — 85027 COMPLETE CBC AUTOMATED: CPT

## 2024-05-14 PROCEDURE — 83935 ASSAY OF URINE OSMOLALITY: CPT

## 2024-05-14 PROCEDURE — 84484 ASSAY OF TROPONIN QUANT: CPT

## 2024-05-14 PROCEDURE — 84133 ASSAY OF URINE POTASSIUM: CPT

## 2024-05-14 PROCEDURE — 87449 NOS EACH ORGANISM AG IA: CPT

## 2024-05-14 PROCEDURE — 87150 DNA/RNA AMPLIFIED PROBE: CPT

## 2024-05-14 PROCEDURE — 87077 CULTURE AEROBIC IDENTIFY: CPT

## 2024-05-14 PROCEDURE — 86618 LYME DISEASE ANTIBODY: CPT

## 2024-05-14 PROCEDURE — 80048 BASIC METABOLIC PNL TOTAL CA: CPT

## 2024-05-14 PROCEDURE — 82553 CREATINE MB FRACTION: CPT

## 2024-05-14 PROCEDURE — 86666 EHRLICHIA ANTIBODY: CPT

## 2024-05-14 PROCEDURE — 0225U NFCT DS DNA&RNA 21 SARSCOV2: CPT

## 2024-05-14 PROCEDURE — 87186 SC STD MICRODIL/AGAR DIL: CPT

## 2024-05-14 PROCEDURE — 84156 ASSAY OF PROTEIN URINE: CPT

## 2024-05-14 PROCEDURE — 82570 ASSAY OF URINE CREATININE: CPT

## 2024-05-14 PROCEDURE — 84100 ASSAY OF PHOSPHORUS: CPT

## 2024-05-14 PROCEDURE — 80375 DRUG/SUBSTANCE NOS 1-3: CPT

## 2024-05-14 PROCEDURE — 84540 ASSAY OF URINE/UREA-N: CPT

## 2024-05-14 PROCEDURE — 82962 GLUCOSE BLOOD TEST: CPT

## 2024-05-14 PROCEDURE — 94640 AIRWAY INHALATION TREATMENT: CPT

## 2024-05-14 PROCEDURE — 96374 THER/PROPH/DIAG INJ IV PUSH: CPT

## 2024-05-14 PROCEDURE — 96375 TX/PRO/DX INJ NEW DRUG ADDON: CPT

## 2024-05-14 PROCEDURE — 70450 CT HEAD/BRAIN W/O DYE: CPT | Mod: MC

## 2024-05-14 PROCEDURE — 86900 BLOOD TYPING SEROLOGIC ABO: CPT

## 2024-05-14 PROCEDURE — 85730 THROMBOPLASTIN TIME PARTIAL: CPT

## 2024-05-14 PROCEDURE — 83605 ASSAY OF LACTIC ACID: CPT

## 2024-05-14 PROCEDURE — 71045 X-RAY EXAM CHEST 1 VIEW: CPT

## 2024-05-14 PROCEDURE — 81001 URINALYSIS AUTO W/SCOPE: CPT

## 2024-05-14 PROCEDURE — 85610 PROTHROMBIN TIME: CPT

## 2024-05-14 PROCEDURE — 82550 ASSAY OF CK (CPK): CPT

## 2024-05-14 PROCEDURE — 71250 CT THORAX DX C-: CPT | Mod: MC

## 2024-05-14 PROCEDURE — 82803 BLOOD GASES ANY COMBINATION: CPT

## 2024-05-14 PROCEDURE — 93005 ELECTROCARDIOGRAM TRACING: CPT | Mod: 76

## 2024-05-14 PROCEDURE — 86850 RBC ANTIBODY SCREEN: CPT

## 2024-05-14 PROCEDURE — 97161 PT EVAL LOW COMPLEX 20 MIN: CPT

## 2024-05-14 PROCEDURE — 87899 AGENT NOS ASSAY W/OPTIC: CPT

## 2024-05-14 PROCEDURE — 87086 URINE CULTURE/COLONY COUNT: CPT

## 2024-05-14 PROCEDURE — 36415 COLL VENOUS BLD VENIPUNCTURE: CPT

## 2024-05-14 RX ORDER — MAGNESIUM SULFATE 500 MG/ML
1 VIAL (ML) INJECTION ONCE
Refills: 0 | Status: COMPLETED | OUTPATIENT
Start: 2024-05-14 | End: 2024-05-14

## 2024-05-14 RX ADMIN — CLOPIDOGREL BISULFATE 75 MILLIGRAM(S): 75 TABLET, FILM COATED ORAL at 11:15

## 2024-05-14 RX ADMIN — Medication 100 GRAM(S): at 08:21

## 2024-05-14 RX ADMIN — Medication 81 MILLIGRAM(S): at 11:16

## 2024-05-14 RX ADMIN — PANTOPRAZOLE SODIUM 40 MILLIGRAM(S): 20 TABLET, DELAYED RELEASE ORAL at 09:21

## 2024-05-14 RX ADMIN — Medication 200 MILLIGRAM(S): at 09:15

## 2024-05-14 RX ADMIN — HEPARIN SODIUM 5000 UNIT(S): 5000 INJECTION INTRAVENOUS; SUBCUTANEOUS at 06:12

## 2024-05-14 RX ADMIN — Medication 120 MILLIGRAM(S): at 09:15

## 2024-05-14 RX ADMIN — LISINOPRIL 5 MILLIGRAM(S): 2.5 TABLET ORAL at 06:12

## 2024-05-14 RX ADMIN — Medication 100 MILLIGRAM(S): at 06:37

## 2024-05-14 NOTE — PROGRESS NOTE ADULT - REASON FOR ADMISSION
Hyperdynamic Shock

## 2024-05-14 NOTE — PROGRESS NOTE ADULT - PROVIDER SPECIALTY LIST ADULT
Internal Medicine
Internal Medicine
MICU
MICU
Internal Medicine

## 2024-05-16 LAB
CULTURE RESULTS: SIGNIFICANT CHANGE UP
SPECIMEN SOURCE: SIGNIFICANT CHANGE UP

## 2024-05-20 DIAGNOSIS — R57.1 HYPOVOLEMIC SHOCK: ICD-10-CM

## 2024-05-20 DIAGNOSIS — I10 ESSENTIAL (PRIMARY) HYPERTENSION: ICD-10-CM

## 2024-05-20 DIAGNOSIS — E86.0 DEHYDRATION: ICD-10-CM

## 2024-05-20 DIAGNOSIS — R73.03 PREDIABETES: ICD-10-CM

## 2024-05-20 DIAGNOSIS — E83.42 HYPOMAGNESEMIA: ICD-10-CM

## 2024-05-20 DIAGNOSIS — A41.9 SEPSIS, UNSPECIFIED ORGANISM: ICD-10-CM

## 2024-05-20 DIAGNOSIS — E87.6 HYPOKALEMIA: ICD-10-CM

## 2024-05-20 DIAGNOSIS — R94.31 ABNORMAL ELECTROCARDIOGRAM [ECG] [EKG]: ICD-10-CM

## 2024-05-20 DIAGNOSIS — A41.51 SEPSIS DUE TO ESCHERICHIA COLI [E. COLI]: ICD-10-CM

## 2024-05-20 DIAGNOSIS — K52.9 NONINFECTIVE GASTROENTERITIS AND COLITIS, UNSPECIFIED: ICD-10-CM

## 2024-05-20 DIAGNOSIS — G93.41 METABOLIC ENCEPHALOPATHY: ICD-10-CM

## 2024-05-20 DIAGNOSIS — Z95.1 PRESENCE OF AORTOCORONARY BYPASS GRAFT: ICD-10-CM

## 2024-05-20 DIAGNOSIS — N17.9 ACUTE KIDNEY FAILURE, UNSPECIFIED: ICD-10-CM

## 2024-05-20 DIAGNOSIS — I25.10 ATHEROSCLEROTIC HEART DISEASE OF NATIVE CORONARY ARTERY WITHOUT ANGINA PECTORIS: ICD-10-CM

## 2024-05-20 DIAGNOSIS — R65.21 SEVERE SEPSIS WITH SEPTIC SHOCK: ICD-10-CM

## 2024-05-23 LAB — METFORMIN SERPL-MCNC: 0.61 MCG/ML — SIGNIFICANT CHANGE UP

## 2024-06-20 NOTE — PRE-OP CHECKLIST - HEART RATE (BEATS/MIN)
tartrate (LOPRESSOR) 50 MG tablet, Take 1 tablet by mouth 2 times daily, Disp: 60 tablet, Rfl: 5    mometasone-formoterol (DULERA) 200-5 MCG/ACT inhaler, inhale 2 puffs by mouth and INTO THE LUNGS twice a day, Disp: 8.8 g, Rfl: 2    MOUNJARO 15 MG/0.5ML SOPN SC injection, Inject 0.5 mLs into the skin once a week, Disp: 6 mL, Rfl: 1    hydroCHLOROthiazide (HYDRODIURIL) 25 MG tablet, Take 1 tablet by mouth daily as needed (swelling), Disp: 30 tablet, Rfl: 5    albuterol sulfate HFA (PROVENTIL;VENTOLIN;PROAIR) 108 (90 Base) MCG/ACT inhaler, inhale 2 puffs by mouth and INTO THE LUNGS every 6 hours if needed for wheezing, Disp: 8.5 g, Rfl: 2    B-D INS SYR ULTRAFINE 1CC/30G 30G X 1/2\" 1 ML MISC, use as directed three times a day, Disp: 100 each, Rfl: 2    nitroGLYCERIN (NITROSTAT) 0.3 MG SL tablet, place 1 tablet under the tongue if needed every 5 minutes for chest pain, Disp: 100 tablet, Rfl: 0    Lancets (ONETOUCH DELICA PLUS [id number]) MISC, use 1 LANCET to TEST BLOOD SUGAR three to four times a day, Disp: 100 each, Rfl: 2    blood glucose test strips (ASCENSIA AUTODISC VI;ONE TOUCH ULTRA TEST VI) strip, 1 each by In Vitro route daily As needed., Disp: 100 each, Rfl: 3    Continuous Blood Gluc Sensor (FREESTYLE DIPTI 14 DAY SENSOR) MISC, Use as directed to check BG QID & PRN, Disp: 1 each, Rfl: 3    Continuous Blood Gluc  (FREESTYLE DIPTI 2 READER) MANNY, Use as directed to check BG QID & PRN, Disp: 2 each, Rfl: 3    vitamin B-1 (THIAMINE) 100 MG tablet, Take 1 tablet by mouth daily, Disp: , Rfl:     vitamin C (ASCORBIC ACID) 500 MG tablet, Take 1 tablet by mouth daily, Disp: , Rfl:     Zinc Sulfate (ZINC 15 PO), Take by mouth, Disp: , Rfl:     Allergies:     Allergies   Allergen Reactions    Sulfa Antibiotics Other (See Comments)     Unknown reaction       Social History:     Social History     Tobacco Use    Smoking status: Former     Current packs/day: 0.00     Average packs/day: 1 pack/day for 25.8 years 
80